# Patient Record
Sex: MALE | Race: WHITE | NOT HISPANIC OR LATINO | Employment: FULL TIME | URBAN - METROPOLITAN AREA
[De-identification: names, ages, dates, MRNs, and addresses within clinical notes are randomized per-mention and may not be internally consistent; named-entity substitution may affect disease eponyms.]

---

## 2015-02-02 LAB — HIV1/HIV2 ANTIBODY: NON REACTIVE

## 2018-01-09 ENCOUNTER — NURSE TRIAGE (OUTPATIENT)
Dept: ADMINISTRATIVE | Facility: CLINIC | Age: 43
End: 2018-01-09

## 2018-01-09 NOTE — TELEPHONE ENCOUNTER
Patient stated he has been having elevated blood sugars lately and wanted to establish care with Dr. Humphreys at [facility]. Transferred to [facility] scheduling for another appointment. Instructed to call back with any additional problems and/or concerns and he verbalized understanding.    Reason for Disposition   Blood glucose  mg/dl (3.5 -13 mmol/l)    Protocols used: ST DIABETES - HIGH BLOOD SUGAR-A-OH

## 2018-01-15 ENCOUNTER — OFFICE VISIT (OUTPATIENT)
Dept: FAMILY MEDICINE | Facility: CLINIC | Age: 43
End: 2018-01-15
Payer: COMMERCIAL

## 2018-01-15 ENCOUNTER — LAB VISIT (OUTPATIENT)
Dept: LAB | Facility: HOSPITAL | Age: 43
End: 2018-01-15
Attending: FAMILY MEDICINE
Payer: COMMERCIAL

## 2018-01-15 VITALS
DIASTOLIC BLOOD PRESSURE: 84 MMHG | HEART RATE: 84 BPM | SYSTOLIC BLOOD PRESSURE: 128 MMHG | BODY MASS INDEX: 34.84 KG/M2 | HEIGHT: 72 IN | TEMPERATURE: 98 F | RESPIRATION RATE: 20 BRPM | WEIGHT: 257.25 LBS | OXYGEN SATURATION: 97 %

## 2018-01-15 DIAGNOSIS — Z23 NEED FOR TDAP VACCINATION: ICD-10-CM

## 2018-01-15 DIAGNOSIS — Z23 NEED FOR PNEUMOCOCCAL VACCINATION: ICD-10-CM

## 2018-01-15 DIAGNOSIS — F33.1 MODERATE EPISODE OF RECURRENT MAJOR DEPRESSIVE DISORDER: ICD-10-CM

## 2018-01-15 DIAGNOSIS — D50.9 IRON DEFICIENCY ANEMIA, UNSPECIFIED IRON DEFICIENCY ANEMIA TYPE: ICD-10-CM

## 2018-01-15 DIAGNOSIS — I10 ESSENTIAL HYPERTENSION: ICD-10-CM

## 2018-01-15 DIAGNOSIS — Z00.00 VISIT FOR WELL MAN HEALTH CHECK: Primary | ICD-10-CM

## 2018-01-15 DIAGNOSIS — Z00.00 VISIT FOR WELL MAN HEALTH CHECK: ICD-10-CM

## 2018-01-15 LAB
ALBUMIN SERPL BCP-MCNC: 4.3 G/DL
ALP SERPL-CCNC: 85 U/L
ALT SERPL W/O P-5'-P-CCNC: 63 U/L
ANION GAP SERPL CALC-SCNC: 9 MMOL/L
AST SERPL-CCNC: 30 U/L
BASOPHILS # BLD AUTO: 0.04 K/UL
BASOPHILS NFR BLD: 0.6 %
BILIRUB SERPL-MCNC: 0.3 MG/DL
BUN SERPL-MCNC: 13 MG/DL
CALCIUM SERPL-MCNC: 10.3 MG/DL
CHLORIDE SERPL-SCNC: 101 MMOL/L
CHOLEST SERPL-MCNC: 149 MG/DL
CHOLEST/HDLC SERPL: 4 {RATIO}
CO2 SERPL-SCNC: 29 MMOL/L
CREAT SERPL-MCNC: 0.8 MG/DL
DIFFERENTIAL METHOD: NORMAL
EOSINOPHIL # BLD AUTO: 0.2 K/UL
EOSINOPHIL NFR BLD: 2.2 %
ERYTHROCYTE [DISTWIDTH] IN BLOOD BY AUTOMATED COUNT: 11.9 %
EST. GFR  (AFRICAN AMERICAN): >60 ML/MIN/1.73 M^2
EST. GFR  (NON AFRICAN AMERICAN): >60 ML/MIN/1.73 M^2
ESTIMATED AVG GLUCOSE: 209 MG/DL
GLUCOSE SERPL-MCNC: 135 MG/DL
HBA1C MFR BLD HPLC: 8.9 %
HCT VFR BLD AUTO: 45.8 %
HDLC SERPL-MCNC: 37 MG/DL
HDLC SERPL: 24.8 %
HGB BLD-MCNC: 15.4 G/DL
LDLC SERPL CALC-MCNC: 66.6 MG/DL
LYMPHOCYTES # BLD AUTO: 2.7 K/UL
LYMPHOCYTES NFR BLD: 38.7 %
MCH RBC QN AUTO: 29.8 PG
MCHC RBC AUTO-ENTMCNC: 33.6 G/DL
MCV RBC AUTO: 89 FL
MONOCYTES # BLD AUTO: 0.8 K/UL
MONOCYTES NFR BLD: 11.2 %
NEUTROPHILS # BLD AUTO: 3.3 K/UL
NEUTROPHILS NFR BLD: 47.3 %
NONHDLC SERPL-MCNC: 112 MG/DL
PLATELET # BLD AUTO: 191 K/UL
PMV BLD AUTO: 9.8 FL
POTASSIUM SERPL-SCNC: 5.1 MMOL/L
PROT SERPL-MCNC: 7.2 G/DL
RBC # BLD AUTO: 5.16 M/UL
SODIUM SERPL-SCNC: 139 MMOL/L
T4 FREE SERPL-MCNC: 0.92 NG/DL
TRIGL SERPL-MCNC: 227 MG/DL
TSH SERPL DL<=0.005 MIU/L-ACNC: 1.51 UIU/ML
WBC # BLD AUTO: 6.97 K/UL

## 2018-01-15 PROCEDURE — 80053 COMPREHEN METABOLIC PANEL: CPT

## 2018-01-15 PROCEDURE — 85025 COMPLETE CBC W/AUTO DIFF WBC: CPT

## 2018-01-15 PROCEDURE — 90715 TDAP VACCINE 7 YRS/> IM: CPT | Mod: S$GLB,,, | Performed by: FAMILY MEDICINE

## 2018-01-15 PROCEDURE — 90732 PPSV23 VACC 2 YRS+ SUBQ/IM: CPT | Mod: S$GLB,,, | Performed by: FAMILY MEDICINE

## 2018-01-15 PROCEDURE — 99999 PR PBB SHADOW E&M-EST. PATIENT-LVL III: CPT | Mod: PBBFAC,,, | Performed by: FAMILY MEDICINE

## 2018-01-15 PROCEDURE — 90472 IMMUNIZATION ADMIN EACH ADD: CPT | Mod: S$GLB,,, | Performed by: FAMILY MEDICINE

## 2018-01-15 PROCEDURE — 84443 ASSAY THYROID STIM HORMONE: CPT

## 2018-01-15 PROCEDURE — 90471 IMMUNIZATION ADMIN: CPT | Mod: S$GLB,,, | Performed by: FAMILY MEDICINE

## 2018-01-15 PROCEDURE — 36415 COLL VENOUS BLD VENIPUNCTURE: CPT | Mod: PN

## 2018-01-15 PROCEDURE — 83036 HEMOGLOBIN GLYCOSYLATED A1C: CPT

## 2018-01-15 PROCEDURE — 80061 LIPID PANEL: CPT

## 2018-01-15 PROCEDURE — 84439 ASSAY OF FREE THYROXINE: CPT

## 2018-01-15 PROCEDURE — 99396 PREV VISIT EST AGE 40-64: CPT | Mod: 25,S$GLB,, | Performed by: FAMILY MEDICINE

## 2018-01-15 RX ORDER — HYDROCHLOROTHIAZIDE 25 MG/1
25 TABLET ORAL EVERY MORNING
Refills: 3 | COMMUNITY
Start: 2017-12-05 | End: 2018-08-20 | Stop reason: ALTCHOICE

## 2018-01-15 RX ORDER — DOXEPIN HYDROCHLORIDE 10 MG/1
10 CAPSULE ORAL NIGHTLY
Refills: 3 | COMMUNITY
Start: 2017-12-01 | End: 2018-04-02

## 2018-01-15 RX ORDER — CLONAZEPAM 1 MG/1
1 TABLET ORAL 3 TIMES DAILY
Refills: 5 | COMMUNITY
Start: 2017-12-06

## 2018-01-15 RX ORDER — DULOXETIN HYDROCHLORIDE 60 MG/1
120 CAPSULE, DELAYED RELEASE ORAL DAILY
Refills: 3 | COMMUNITY
Start: 2017-12-21 | End: 2018-04-02

## 2018-01-15 RX ORDER — DOXYCYCLINE HYCLATE 100 MG
TABLET ORAL
Refills: 2 | COMMUNITY
Start: 2017-12-19 | End: 2018-04-02

## 2018-01-15 RX ORDER — GLIPIZIDE 5 MG/1
TABLET, FILM COATED, EXTENDED RELEASE ORAL
Refills: 3 | COMMUNITY
Start: 2017-12-26 | End: 2018-04-02

## 2018-01-15 RX ORDER — BLOOD-GLUCOSE METER
KIT MISCELLANEOUS
Refills: 3 | COMMUNITY
Start: 2017-12-21

## 2018-01-15 RX ORDER — PEN NEEDLE, DIABETIC 32GX 5/32"
NEEDLE, DISPOSABLE MISCELLANEOUS
Refills: 1 | COMMUNITY
Start: 2017-11-21 | End: 2019-03-18 | Stop reason: SDUPTHER

## 2018-01-15 RX ORDER — ATORVASTATIN CALCIUM 20 MG/1
20 TABLET, FILM COATED ORAL DAILY
Refills: 1 | COMMUNITY
Start: 2017-12-01 | End: 2019-01-03 | Stop reason: SDUPTHER

## 2018-01-15 RX ORDER — LISINOPRIL 40 MG/1
40 TABLET ORAL DAILY
Refills: 1 | COMMUNITY
Start: 2017-12-21 | End: 2018-04-02

## 2018-01-15 RX ORDER — BLOOD-GLUCOSE METER
KIT MISCELLANEOUS
Refills: 0 | COMMUNITY
Start: 2017-11-21

## 2018-01-15 RX ORDER — DEXTROAMPHETAMINE SACCHARATE, AMPHETAMINE ASPARTATE, DEXTROAMPHETAMINE SULFATE AND AMPHETAMINE SULFATE 7.5; 7.5; 7.5; 7.5 MG/1; MG/1; MG/1; MG/1
TABLET ORAL
Refills: 0 | COMMUNITY
Start: 2017-12-21

## 2018-01-15 RX ORDER — INSULIN GLARGINE 100 [IU]/ML
INJECTION, SOLUTION SUBCUTANEOUS
Refills: 1 | COMMUNITY
Start: 2017-11-21 | End: 2018-10-23 | Stop reason: SDUPTHER

## 2018-01-15 RX ORDER — METFORMIN HYDROCHLORIDE 500 MG/1
TABLET ORAL
Refills: 3 | COMMUNITY
Start: 2017-12-28 | End: 2018-07-16 | Stop reason: ALTCHOICE

## 2018-01-15 NOTE — PROGRESS NOTES
The Tdap and pneumococcal 23 vaccines were  given to the pt as ordered by the MD. The patient tolerated well, I advised the patient to wait 15 minuets to observe for any vaccine reactions. The pt. Expressed an understanding.

## 2018-01-15 NOTE — PROGRESS NOTES
[facility] VISIT      CHIEF COMPLAINT  Chief Complaint   Patient presents with    Annual Exam     hx of elevated b/p and glucose        HPI  Enrique Grande is a 42 y.o. male who presents for physical and to establish care.     Social Factors  Tobacco use: No  Ready to Quit: No  Alcohol: No  Intimate partner violence screening  Regular Exercise: No    Depression  Over the past two weeks, have you felt down, depressed, or hopeless? Yes sees psychiatry  Over the past two weeks, have you felt little interest or pleasure in doing things? Yes     Reproductive Health  STD screening in last year: declined  HIV screening: declined    Screen for Chronic Disease  CHD Risk Factors: diabetes mellitus, hypertension, male gender and obesity (BMI >= 30 kg/m2)  Estimated body mass index is 34.89 kg/m² as calculated from the following:    Height as of this encounter: 6' (1.829 m).    Weight as of this encounter: 116.7 kg (257 lb 4.4 oz).  Dyslipidemia screening needed: order 1/15/18  T2DM screening needed: order 1/15/18  Colonoscopy needed: n/a  PSA needed: 1/15/18  AAA screening needed:n/a  Screen men 35 years and older, and men 20 to 34 years of age who have cardiovascular risk factors for dyslipidemia  Begin screening colonoscopies at 50 years of age in men of average risk, and continue until 75 years of age; offer fecal occult blood testing every year, flexible sigmoidoscopy every five years combined with fecal occult blood testing every three years, or colonoscopy every 10 years   The American Urological Association recommends offering PSA testing and digital rectal examination to well-informed men beginning at 40 years of age and continuing until life expectancy is less than 10 years  Screen once with ultrasonography in men 65 to 75 years of age if they have a family history or have smoked at [id number] cigarettes in their lifetime  Screen men with a sustained blood pressure greater than 135/80 mm Hg for  T2DM      Immunizations  delayed    ALLERGIES and MEDS were verified.   PMHx, PSHx, FHx, SOCIALHx were updated as pertinent.    REVIEW OF SYSTEMS  Review of Systems   Constitutional: Negative.    HENT: Negative.    Eyes: Negative.    Respiratory: Negative.    Cardiovascular: Negative.    Gastrointestinal: Negative.    Genitourinary: Negative.    Musculoskeletal: Negative.    Skin: Negative.    Psychiatric/Behavioral: Positive for depression. Negative for suicidal ideas.         PHYSICAL EXAM  VITAL SIGNS: /84   Pulse 84   Temp 97.7 °F (36.5 °C) (Oral)   Resp 20   Ht 6' (1.829 m)   Wt 116.7 kg (257 lb 4.4 oz)   SpO2 97%   BMI 34.89 kg/m²   GEN: Well developed, Well nourished, No acute distress.  HENT: Normocephalic, Atraumatic, Bilateral external ears normal, Nose normal, Oropharynx moist, No oral exudates.   Eyes: PERRL, EOMI, Conjunctiva normal, No discharge.   Neck: Supple, No tenderness.  Lymphatic: No cervical or supraclavicular lymphadenopathy noted.   Cardiovascular: Normal heart rate, Normal rhythm, No murmurs, No rubs, No gallops.   Thorax & Lungs: Normal breath sounds, No respiratory distress, No wheezing.  Abdomen: Soft, No tenderness, Bowel sounds normal.  Genital: deffered  Skin: Warm, Dry, No erythema, No rash.   Extremities: No edema, No tenderness.       ASSESSMENT/PLAN    Enrique was seen today for annual exam.    Diagnoses and all orders for this visit:    Visit for [facility] check  -     (In Office Administered) Tdap Vaccine  -     Hemoglobin A1c; Future  -     Lipid panel; Future  -     (In Office Administered) Pneumococcal Polysaccharide Vaccine (23 Valent) (SQ/IM)  -     CBC auto differential; Future  -     Comprehensive metabolic panel; Future  -     MICROALBUMIN / CREATININE RATIO URINE; Future  -     TSH; Future  -     T4, free; Future    Uncontrolled type 2 diabetes mellitus without complication, with long-term current use of insulin  -     Hemoglobin A1c;  Future    Essential hypertension    Iron deficiency anemia, unspecified iron deficiency anemia type    Moderate episode of recurrent major depressive disorder       1.  Patient to have labs done today  2.  Likely poor adherence to medication regimen causing hyperglycemia  3.  HTN controlled at this time  4.  Follow up with psychiatry as scheduled.  He is to go to the ED for any SI/HI or AH/VH  5.  Dermatology follow up for psoriasis  6.  Follow up with me in 3 months or sooner if needed    Sunny Humphreys MD

## 2018-01-22 ENCOUNTER — TELEPHONE (OUTPATIENT)
Dept: FAMILY MEDICINE | Facility: CLINIC | Age: 43
End: 2018-01-22

## 2018-01-29 ENCOUNTER — TELEPHONE (OUTPATIENT)
Dept: ENDOCRINOLOGY | Facility: CLINIC | Age: 43
End: 2018-01-29

## 2018-01-29 NOTE — TELEPHONE ENCOUNTER
Left message for patient to return call to clinic to reschedule initial visit per patient's request before April. No visits week of Mardi [place] available.

## 2018-01-29 NOTE — TELEPHONE ENCOUNTER
----- Message from Sabrina De Jesus sent at 1/26/2018  4:13 PM CST -----  Contact: self  Pt asked if he can get in for the week of [place] A [place] to please give him a call at [phone]. Thanks

## 2018-01-29 NOTE — TELEPHONE ENCOUNTER
Patient called back and rescheduled appointment for Mon 3/12/18 at 2:00p. Patient verbalized understanding. Reminder letter mailed.

## 2018-04-02 ENCOUNTER — OFFICE VISIT (OUTPATIENT)
Dept: FAMILY MEDICINE | Facility: CLINIC | Age: 43
End: 2018-04-02
Payer: COMMERCIAL

## 2018-04-02 VITALS
OXYGEN SATURATION: 96 % | SYSTOLIC BLOOD PRESSURE: 110 MMHG | HEIGHT: 72 IN | TEMPERATURE: 98 F | WEIGHT: 247.38 LBS | BODY MASS INDEX: 33.51 KG/M2 | DIASTOLIC BLOOD PRESSURE: 78 MMHG | HEART RATE: 71 BPM

## 2018-04-02 DIAGNOSIS — I10 ESSENTIAL HYPERTENSION: Primary | ICD-10-CM

## 2018-04-02 PROCEDURE — 3074F SYST BP LT 130 MM HG: CPT | Mod: CPTII,S$GLB,, | Performed by: FAMILY MEDICINE

## 2018-04-02 PROCEDURE — 99214 OFFICE O/P EST MOD 30 MIN: CPT | Mod: S$GLB,,, | Performed by: FAMILY MEDICINE

## 2018-04-02 PROCEDURE — 3045F PR MOST RECENT HEMOGLOBIN A1C LEVEL 7.0-9.0%: CPT | Mod: CPTII,S$GLB,, | Performed by: FAMILY MEDICINE

## 2018-04-02 PROCEDURE — 99999 PR PBB SHADOW E&M-EST. PATIENT-LVL III: CPT | Mod: PBBFAC,,, | Performed by: FAMILY MEDICINE

## 2018-04-02 PROCEDURE — 3078F DIAST BP <80 MM HG: CPT | Mod: CPTII,S$GLB,, | Performed by: FAMILY MEDICINE

## 2018-04-02 RX ORDER — LISINOPRIL 20 MG/1
20 TABLET ORAL DAILY
COMMUNITY
End: 2018-06-11

## 2018-04-02 RX ORDER — FLUVOXAMINE MALEATE 50 MG/1
TABLET ORAL
Refills: 5 | COMMUNITY
Start: 2018-02-28 | End: 2018-10-23

## 2018-04-02 RX ORDER — OXYCODONE AND ACETAMINOPHEN 5; 325 MG/1; MG/1
1 TABLET ORAL
COMMUNITY
Start: 2014-06-06 | End: 2018-04-02

## 2018-04-02 RX ORDER — LITHIUM CARBONATE 300 MG/1
1800 CAPSULE ORAL NIGHTLY
Refills: 11 | COMMUNITY
Start: 2018-02-28

## 2018-04-02 NOTE — PROGRESS NOTES
"Routine Office Visit    Patient Name: Enrique Grande    : 1975  MRN: [id number]    Subjective:  Enrique is a 42 y.o. male who presents today for:    1. htn  Patient presenting today with complaints of low blood pressures.  He states that he was having these low readings of 96/58 prior to going to [facility] who stopped his norvasc.  Since then, his blood pressures have been running normal.  There has been no dizziness, weakness, slurred speech, or confusion.  He states that he "feels weird" at time and that's when his blood pressure is lower.  He has not been checking his blood sugars during these episodes    Past Medical History  Past Medical History:   Diagnosis Date    Anxiety     AR (allergic rhinitis)     Depression     Diabetes mellitus, type 2     GERD (gastroesophageal reflux disease)     History of psychiatric hospitalization     Hx of psychiatric care     Hypertension     Psychiatric problem     Therapy        Past Surgical History  Past Surgical History:   Procedure Laterality Date    FOREARM SURGERY      deep laceration, with nerve and tendon injuries       Family History  Family History   Problem Relation Age of Onset    Diabetes Mother     Alcohol abuse Father     Diabetes Sister     Diabetes Maternal Aunt     Alcohol abuse Paternal Uncle     Alcohol abuse Paternal Grandfather     Suicide Neg Hx     Schizophrenia Neg Hx     Bipolar disorder Neg Hx     Dementia Neg Hx        Social History  Social History     Social History    Marital status: Single     Spouse name: N/A    Number of children: 0    Years of education: N/A     Occupational History    teacher      [employer]     Social History Main Topics    Smoking status: Never Smoker    Smokeless tobacco: Not on file    Alcohol use 1.8 oz/week     3 Glasses of wine per week      Comment: socially    Drug use: No    Sexual activity: Not on file     Other Topics Concern    Not on file     Social History Narrative " "   The patient does not exercise regularly ().  Rates diet as good.  He is not satisfied with weight.        Pt is the 3rd child and only boy of 5 children from parents who  when he was very young.  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  Hobbies when he feels better include travel, gardening, reading, and a pet dog.  He never , has no children, lives alone, and broke up with a male partner about 18 months ago, with no current relationship.  He has few friends and is not Voodoo.                   Current Medications  Current Outpatient Prescriptions on File Prior to Visit   Medication Sig Dispense Refill    aspirin 81 MG Chew Take 81 mg by mouth once daily.      atorvastatin (LIPITOR) 20 MG tablet Take 20 mg by mouth once daily.  1    clonazePAM (KLONOPIN) 1 MG tablet Take 1 mg by mouth 3 (three) times daily.  5    dextroamphetamine-amphetamine 30 mg Tab TAKE 1 TABLET BY MOUTH 3 TIMES A DAY Do Not Fill Before 11/29/2017  0    hydroCHLOROthiazide (HYDRODIURIL) 25 MG tablet Take 25 mg by mouth every morning.  3    LANTUS SOLOSTAR 100 unit/mL (3 mL) InPn pen INJECT 33u SUBCUTANEOUS TWICE A DAY  1    metFORMIN (GLUCOPHAGE) 500 MG tablet TAKE 2 TABLETS BY MOUTH TWICE A DAY FOR BLOOD SUGAR  3    [DISCONTINUED] glipiZIDE (GLUCOTROL) 5 MG TR24 TK 1 T PO QD IN THE MORNING  3    FREESTYLE FREEDOM LITE kit USE TO CHECK BLOOD SUGAR as directed daily  0    FREESTYLE LITE STRIPS Strp as directed Three times daily as needed  3    guselkumab (TREMFYA) 100 mg/mL Syrg Inject 100 mg into the skin.      ULTICARE PEN NEEDLE 32 gauge x 5/32" Ndle USE AS DIRECTED TWICE A DAY FOR INSULIN INJECTION  1    [DISCONTINUED] doxepin (SINEQUAN) 10 MG capsule Take 10 mg by mouth nightly.  3    [DISCONTINUED] doxycycline (VIBRA-TABS) 100 MG tablet TAKE 1 TABLET BY MOUTH ONCE A DAY WITH FOOD  2    [DISCONTINUED] " DULoxetine (CYMBALTA) 60 MG capsule Take 120 mg by mouth once daily.  3    [DISCONTINUED] lisinopril (PRINIVIL,ZESTRIL) 40 MG tablet Take 40 mg by mouth once daily.  1     No current facility-administered medications on file prior to visit.        Allergies   Review of patient's allergies indicates:  No Known Allergies    Review of Systems (Pertinent positives)  Review of Systems   Constitutional: Negative.    HENT: Negative.    Respiratory: Negative.    Cardiovascular: Negative.    Gastrointestinal: Negative.    Musculoskeletal: Negative.    Skin: Negative.          /78   Pulse 71   Temp 98 °F (36.7 °C) (Oral)   Ht 6' (1.829 m)   Wt 112.2 kg (247 lb 5.7 oz)   SpO2 96%   BMI 33.55 kg/m²     GENERAL APPEARANCE: in no apparent distress and well developed and well nourished  HEENT: PERRL, EOMI, Sclera clear, anicteric, Oropharynx clear, no lesions, Neck supple with midline trachea  NECK: normal, supple, no adenopathy, thyroid normal in size  RESPIRATORY: appears well, vitals normal, no respiratory distress, acyanotic, normal RR, chest clear, no wheezing, crepitations, rhonchi, normal symmetric air entry  HEART: regular rate and rhythm, S1, S2 normal, no murmur, click, rub or gallop.    ABDOMEN: abdomen is soft without tenderness, no masses, no hernias, no organomegaly, no rebound, no guarding. Suprapubic tenderness absent. No CVA tenderness.  NEUROLOGIC: normal without focal findings, CN II-XII are intact.   SKIN: no rashes, no wounds, no other lesions  PSYCH: Alert, oriented x 3, thought content appropriate, speech normal, pleasant and cooperative, good eye contact, well groomed    Protective Sensation (w/ 10 gram monofilament):  Right: Intact  Left: Intact    Visual Inspection:  Normal -  Bilateral    Pedal Pulses:   Right: Present  Left: Present    Posterior tibialis:   Right:Present  Left: Present      Assessment/Plan:  Enrique Grande is a 42 y.o. male who presents today for :    Enrique was  seen today for follow-up.    Diagnoses and all orders for this visit:    Essential hypertension  -     Ambulatory referral to Ophthalmology    Uncontrolled type 2 diabetes mellitus without complication, with long-term current use of insulin  -     Ambulatory referral to Ophthalmology      1.  Patients blood sugars continue to be out of control  2.  Blood pressure stable  3.  Follow up 3 months or sooner if needed  4.  He is to go to the ED for any worsening symptoms      Sunny Humphreys MD

## 2018-04-13 ENCOUNTER — TELEPHONE (OUTPATIENT)
Dept: ENDOCRINOLOGY | Facility: CLINIC | Age: 43
End: 2018-04-13

## 2018-06-08 DIAGNOSIS — Z13.5 DIABETIC RETINOPATHY SCREENING: ICD-10-CM

## 2018-06-11 ENCOUNTER — OFFICE VISIT (OUTPATIENT)
Dept: FAMILY MEDICINE | Facility: CLINIC | Age: 43
End: 2018-06-11
Payer: COMMERCIAL

## 2018-06-11 ENCOUNTER — LAB VISIT (OUTPATIENT)
Dept: LAB | Facility: HOSPITAL | Age: 43
End: 2018-06-11
Attending: FAMILY MEDICINE
Payer: COMMERCIAL

## 2018-06-11 VITALS
HEART RATE: 69 BPM | HEIGHT: 72 IN | SYSTOLIC BLOOD PRESSURE: 106 MMHG | RESPIRATION RATE: 12 BRPM | WEIGHT: 245.38 LBS | BODY MASS INDEX: 33.23 KG/M2 | OXYGEN SATURATION: 97 % | DIASTOLIC BLOOD PRESSURE: 76 MMHG | TEMPERATURE: 98 F

## 2018-06-11 DIAGNOSIS — I10 ESSENTIAL HYPERTENSION: ICD-10-CM

## 2018-06-11 DIAGNOSIS — K52.9 CHRONIC DIARRHEA: Primary | ICD-10-CM

## 2018-06-11 DIAGNOSIS — K52.9 CHRONIC DIARRHEA: ICD-10-CM

## 2018-06-11 LAB
ALBUMIN SERPL BCP-MCNC: 3.9 G/DL
ALP SERPL-CCNC: 74 U/L
ALT SERPL W/O P-5'-P-CCNC: 26 U/L
ANION GAP SERPL CALC-SCNC: 9 MMOL/L
AST SERPL-CCNC: 17 U/L
BILIRUB SERPL-MCNC: 0.3 MG/DL
BUN SERPL-MCNC: 15 MG/DL
CALCIUM SERPL-MCNC: 9.7 MG/DL
CHLORIDE SERPL-SCNC: 102 MMOL/L
CO2 SERPL-SCNC: 26 MMOL/L
CREAT SERPL-MCNC: 0.9 MG/DL
EST. GFR  (AFRICAN AMERICAN): >60 ML/MIN/1.73 M^2
EST. GFR  (NON AFRICAN AMERICAN): >60 ML/MIN/1.73 M^2
ESTIMATED AVG GLUCOSE: 206 MG/DL
GLUCOSE SERPL-MCNC: 161 MG/DL
HBA1C MFR BLD HPLC: 8.8 %
POTASSIUM SERPL-SCNC: 4.1 MMOL/L
PROT SERPL-MCNC: 6.5 G/DL
SODIUM SERPL-SCNC: 137 MMOL/L

## 2018-06-11 PROCEDURE — 99999 PR PBB SHADOW E&M-EST. PATIENT-LVL III: CPT | Mod: PBBFAC,,, | Performed by: FAMILY MEDICINE

## 2018-06-11 PROCEDURE — 83036 HEMOGLOBIN GLYCOSYLATED A1C: CPT

## 2018-06-11 PROCEDURE — 36415 COLL VENOUS BLD VENIPUNCTURE: CPT | Mod: PN

## 2018-06-11 PROCEDURE — 3074F SYST BP LT 130 MM HG: CPT | Mod: CPTII,S$GLB,, | Performed by: FAMILY MEDICINE

## 2018-06-11 PROCEDURE — 3078F DIAST BP <80 MM HG: CPT | Mod: CPTII,S$GLB,, | Performed by: FAMILY MEDICINE

## 2018-06-11 PROCEDURE — 3008F BODY MASS INDEX DOCD: CPT | Mod: CPTII,S$GLB,, | Performed by: FAMILY MEDICINE

## 2018-06-11 PROCEDURE — 99214 OFFICE O/P EST MOD 30 MIN: CPT | Mod: S$GLB,,, | Performed by: FAMILY MEDICINE

## 2018-06-11 PROCEDURE — 80053 COMPREHEN METABOLIC PANEL: CPT

## 2018-06-11 PROCEDURE — 3045F PR MOST RECENT HEMOGLOBIN A1C LEVEL 7.0-9.0%: CPT | Mod: CPTII,S$GLB,, | Performed by: FAMILY MEDICINE

## 2018-06-11 RX ORDER — GLIPIZIDE 5 MG/1
5 TABLET, FILM COATED, EXTENDED RELEASE ORAL DAILY
Refills: 3 | COMMUNITY
Start: 2018-04-12 | End: 2018-07-16

## 2018-06-11 RX ORDER — LISINOPRIL 10 MG/1
10 TABLET ORAL DAILY
Qty: 90 TABLET | Refills: 3 | Status: SHIPPED | OUTPATIENT
Start: 2018-06-11 | End: 2019-01-03 | Stop reason: ALTCHOICE

## 2018-06-11 NOTE — PROGRESS NOTES
"Routine Office Visit    Patient Name: Enrique Grande    : 1975  MRN: [id number]    Subjective:  Enrique is a 43 y.o. male who presents today for:    1. Chronic diarrhea  Patient presenting today for chronic diarrhea for "forever".  He states that he has multiple liquid stools a day.  He states that he thought it was related to his metformin, so he "just quit taking that medication".  He states that the diarrhea continued despite being off the metformin.  He does not want to see GI or have a colonoscopy at this time.  There is no blood in stool and no melena.  He has not had any abdominal pain, fevers, chills, or rash.  There are no foods associated with the diarrhea.  He states that some days he takes more than the recommended dosage of immodium, but then other days he doesn't have to take any.      2.  Type 2 DM  Patient presenting today for follow up of type 2 DM.  He states that this last A1c was the highest it has ever been.  He is now taking all of his medications as prescribed.  He states that he has been taking all of his meds for the past several weeks. No neuropathy at this time.  No hypoglycemic episodes.      Past Medical History  Past Medical History:   Diagnosis Date    Anxiety     AR (allergic rhinitis)     Depression     Diabetes mellitus, type 2     GERD (gastroesophageal reflux disease)     History of psychiatric hospitalization     Hx of psychiatric care     Hypertension     Psychiatric problem     Therapy        Past Surgical History  Past Surgical History:   Procedure Laterality Date    FOREARM SURGERY      deep laceration, with nerve and tendon injuries       Family History  Family History   Problem Relation Age of Onset    Diabetes Mother     Alcohol abuse Father     Diabetes Sister     Diabetes Maternal Aunt     Alcohol abuse Paternal Uncle     Alcohol abuse Paternal Grandfather     Suicide Neg Hx     Schizophrenia Neg Hx     Bipolar disorder Neg Hx     " Dementia Neg Hx        Social History  Social History     Social History    Marital status: Single     Spouse name: N/A    Number of children: 0    Years of education: N/A     Occupational History    teacher      [employer]     Social History Main Topics    Smoking status: Never Smoker    Smokeless tobacco: Not on file    Alcohol use 1.8 oz/week     3 Glasses of wine per week      Comment: socially    Drug use: No    Sexual activity: Not on file     Other Topics Concern    Not on file     Social History Narrative    The patient does not exercise regularly ().  Rates diet as good.  He is not satisfied with weight.        Pt is the 3rd child and only boy of 5 children from parents who  when he was very young.  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  Hobbies when he feels better include travel, gardening, reading, and a pet dog.  He never , has no children, lives alone, and broke up with a male partner about 18 months ago, with no current relationship.  He has few friends and is not [employer].                   Current Medications  Current Outpatient Prescriptions on File Prior to Visit   Medication Sig Dispense Refill    aspirin 81 MG Chew Take 81 mg by mouth once daily.      atorvastatin (LIPITOR) 20 MG tablet Take 20 mg by mouth once daily.  1    clonazePAM (KLONOPIN) 1 MG tablet Take 1 mg by mouth 3 (three) times daily.  5    dextroamphetamine-amphetamine 30 mg Tab TAKE 1 TABLET BY MOUTH 3 TIMES A DAY Do Not Fill Before 11/29/2017  0    fluvoxaMINE (LUVOX) 50 MG Tab tablet TAKE 3 TABLETS BY MOUTH TWICE A DAY AS DIRECTED  5    FREESTYLE FREEDOM LITE kit USE TO CHECK BLOOD SUGAR as directed daily  0    FREESTYLE LITE STRIPS Strp as directed Three times daily as needed  3    guselkumab (TREMFYA) 100 mg/mL Syrg Inject 100 mg into the skin.      hydroCHLOROthiazide (HYDRODIURIL) 25 MG  "tablet Take 25 mg by mouth every morning.  3    LANTUS SOLOSTAR 100 unit/mL (3 mL) InPn pen INJECT 33u SUBCUTANEOUS TWICE A DAY  1    lithium (ESKALITH) 300 MG capsule Take 1,800 mg by mouth nightly.  11    metFORMIN (GLUCOPHAGE) 500 MG tablet TAKE 2 TABLETS BY MOUTH TWICE A DAY FOR BLOOD SUGAR  3    ULTICARE PEN NEEDLE 32 gauge x 5/32" Ndle USE AS DIRECTED TWICE A DAY FOR INSULIN INJECTION  1    [DISCONTINUED] lisinopril (PRINIVIL,ZESTRIL) 20 MG tablet Take 20 mg by mouth once daily.       No current facility-administered medications on file prior to visit.        Allergies   Review of patient's allergies indicates:  No Known Allergies    Review of Systems (Pertinent positives)  Review of Systems   Constitutional: Negative.    HENT: Negative.    Eyes: Negative.    Respiratory: Negative.    Cardiovascular: Negative.    Gastrointestinal: Positive for diarrhea. Negative for abdominal pain, blood in stool, constipation, melena and vomiting.   Musculoskeletal: Negative.    Skin: Negative.          /76 (BP Location: Right arm, Patient Position: Sitting, BP Method: Medium (Manual))   Pulse 69   Temp 98.2 °F (36.8 °C) (Oral)   Resp 12   Ht 6' (1.829 m)   Wt 111.3 kg (245 lb 6 oz)   SpO2 97%   BMI 33.28 kg/m²     GENERAL APPEARANCE: in no apparent distress and well developed and well nourished  HEENT: PERRL, EOMI, Sclera clear, anicteric, Oropharynx clear, no lesions, Neck supple with midline trachea  NECK: normal, supple, no adenopathy, thyroid normal in size  RESPIRATORY: appears well, vitals normal, no respiratory distress, acyanotic, normal RR, chest clear, no wheezing, crepitations, rhonchi, normal symmetric air entry  HEART: regular rate and rhythm, S1, S2 normal, no murmur, click, rub or gallop.    ABDOMEN: abdomen is soft without tenderness, no masses, no hernias, no organomegaly, no rebound, no guarding. Suprapubic tenderness absent. No CVA tenderness.  NEUROLOGIC: normal without focal findings, " "CN II-XII are intact.   SKIN: no rashes, no wounds, no other lesions  PSYCH: Alert, oriented x 3, thought content appropriate, speech normal, pleasant and cooperative, good eye contact, well groomed    Assessment/Plan:  Enrique Grande is a 43 y.o. male who presents today for :    Enrique was seen today for diarrhea.    Diagnoses and all orders for this visit:    Chronic diarrhea  -     Comprehensive metabolic panel; Future  -     cholestyramine-aspartame 4 gram Powd; Take 4 g by mouth once daily.    Uncontrolled type 2 diabetes mellitus without complication, with long-term current use of insulin  -     Comprehensive metabolic panel; Future  -     Hemoglobin A1c; Future  -     lisinopril 10 MG tablet; Take 1 tablet (10 mg total) by mouth once daily.    Essential hypertension  -     lisinopril 10 MG tablet; Take 1 tablet (10 mg total) by mouth once daily.      1.  cholestyraine with aspartame for diarrhea  2.  Recommended he see GI, but he declines  3.  Labs to be done today to check electrolytes and a1c  4.  Requested he have eyecam exam done today, but he states "i'm in a hurry and can't today".   5.  Patient to take all meds as prescribed  6.  He is to follow up in 3 months or sooner if needed    Sunny Humphreys MD      "

## 2018-07-16 ENCOUNTER — OFFICE VISIT (OUTPATIENT)
Dept: ENDOCRINOLOGY | Facility: CLINIC | Age: 43
End: 2018-07-16
Payer: COMMERCIAL

## 2018-07-16 VITALS
HEART RATE: 69 BPM | BODY MASS INDEX: 33.29 KG/M2 | SYSTOLIC BLOOD PRESSURE: 116 MMHG | HEIGHT: 72 IN | DIASTOLIC BLOOD PRESSURE: 83 MMHG | WEIGHT: 245.81 LBS

## 2018-07-16 DIAGNOSIS — E66.9 NON MORBID OBESITY, UNSPECIFIED OBESITY TYPE: ICD-10-CM

## 2018-07-16 DIAGNOSIS — R19.7 DIARRHEA, UNSPECIFIED TYPE: ICD-10-CM

## 2018-07-16 DIAGNOSIS — I10 ESSENTIAL HYPERTENSION: ICD-10-CM

## 2018-07-16 PROCEDURE — 3008F BODY MASS INDEX DOCD: CPT | Mod: CPTII,S$GLB,, | Performed by: NURSE PRACTITIONER

## 2018-07-16 PROCEDURE — 99999 PR PBB SHADOW E&M-EST. PATIENT-LVL III: CPT | Mod: PBBFAC,,, | Performed by: NURSE PRACTITIONER

## 2018-07-16 PROCEDURE — 3045F PR MOST RECENT HEMOGLOBIN A1C LEVEL 7.0-9.0%: CPT | Mod: CPTII,S$GLB,, | Performed by: NURSE PRACTITIONER

## 2018-07-16 PROCEDURE — 3079F DIAST BP 80-89 MM HG: CPT | Mod: CPTII,S$GLB,, | Performed by: NURSE PRACTITIONER

## 2018-07-16 PROCEDURE — 99204 OFFICE O/P NEW MOD 45 MIN: CPT | Mod: S$GLB,,, | Performed by: NURSE PRACTITIONER

## 2018-07-16 PROCEDURE — 3074F SYST BP LT 130 MM HG: CPT | Mod: CPTII,S$GLB,, | Performed by: NURSE PRACTITIONER

## 2018-07-16 RX ORDER — METFORMIN HYDROCHLORIDE 500 MG/1
1000 TABLET, EXTENDED RELEASE ORAL 2 TIMES DAILY WITH MEALS
Qty: 120 TABLET | Refills: 0 | Status: SHIPPED | OUTPATIENT
Start: 2018-07-16 | End: 2018-10-16 | Stop reason: SDUPTHER

## 2018-07-16 RX ORDER — INDOMETHACIN 50 MG/1
CAPSULE ORAL
Refills: 0 | COMMUNITY
Start: 2018-07-09

## 2018-07-16 NOTE — PROGRESS NOTES
CC: This 43 y.o. White male  is here for evaluation of  T2DM along with comorbidities indicated in the Visit Diagnosis section of this encounter.    HPI: Enrique Grande was diagnosed with T2DM in his 30s. Metformin started at the time of diagnosis. + family hx of T2DM - mother, maternal aunt, sister, maternal GM, many cousins. Denies complications from DM in the family.     New to Endocrine. Referred by Dr. Humphreys.   Hasn't taken any metformin for weeks because of diarrhea. However, diarrhea continued while he was off it so he started a probiotic which has helped and he hasn't had any diarrhea recently.    Pt stopped taking glipizide - didn't find it was that helpful. Pt finds it difficult to manage polypharmacy.       Pt sees Psych every 1-3 months.     LAST DIABETES EDUCATION: years ago     HOSPITALIZED FOR DIABETES -  No.    PRESCRIBED DIABETES MEDICATIONS: metformin 1000 mg bid, Lantus Solostar 32 units bid   Misses medication doses - Yes - metformin as above. Denies lantus doses.     DM COMPLICATIONS: none     SIGNIFICANT DIABETES MED HISTORY: has tried Victoza and Byetta but did not find them helpful and c/o GI s/e.     SELF MONITORING BLOOD GLUCOSE: uses accuchek angie. Checks blood glucose at home - inconsistent. Meter reveals 175-403.   Fasting 206 this morning  Bedtime 280s last night     HYPOGLYCEMIC EPISODES: denies      CURRENT DIET: drinks water, club soda, nothing with sugar. Eats 1-3 meals/day. Always eats dinner and usually lunch.   Diet recall: dinner was beet and blue cheese sandwich, pork loin. 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     CURRENT EXERCISE: none; has gym membership. Walks dog once daily - 10-30 minutes each time.     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   PSYCH: + depression, sees Psych, denies SI   OTHER: n/a      PHYSICAL EXAM:  GENERAL: Well developed, well nourished. No acute distress.   PSYCH: AAOx3,  conversant, well-groomed. Judgement and insight good. Pleasant, + anxious   NEURO: Cranial nerves grossly intact. Speech clear, no tremor.   NECK: Trachea midline, no thyromegaly or lymphadenopathy.   CHEST: Respirations even and unlabored. CTA bilaterally.  CARDIOVASCULAR: Regular rate and rhythm. No bruits. No murmur. No edema.   ABDOMEN: Soft, non-tender, non-distended. Bowel sounds present.   MS: Gait steady. No clubbing.   SKIN: Normal skin turgor. Skin warm and dry. No areas of breakdown. No acanthosis nigricans.  FOOT: deferred, saw Podiatry last week       Hemoglobin A1C   Date Value Ref Range Status   06/11/2018 8.8 (H) 4.0 - 5.6 % Final     Comment:     ADA Screening Guidelines:  5.7-6.4%  Consistent with prediabetes  >or=6.5%  Consistent with diabetes  High levels of fetal hemoglobin interfere with the HbA1C  assay. Heterozygous hemoglobin variants (HbS, HgC, etc)do  not significantly interfere with this assay.   However, presence of multiple variants may affect accuracy.     01/15/2018 8.9 (H) 4.0 - 5.6 % Final     Comment:     According to ADA guidelines, hemoglobin A1c <7.0% represents  optimal control in non-pregnant diabetic patients. Different  metrics may apply to specific patient populations.   Standards of Medical Care in Diabetes-2016.  For the purpose of screening for the presence of diabetes:  <5.7%     Consistent with the absence of diabetes  5.7-6.4%  Consistent with increasing risk for diabetes   (prediabetes)  >or=6.5%  Consistent with diabetes  Currently, no consensus  exists for use of hemoglobin A1c  for diagnosis of diabetes for children.  This Hemoglobin A1c assay has significant interference with fetal   hemoglobin   (HbF). The results are invalid for patients with abnormal amounts of   HbF,   including those with known Hereditary Persistence   of Fetal Hemoglobin. Heterozygous hemoglobin variants (HbAS, HbAC,   HbAD, HbAE, HbA2) do not significantly interfere with this assay;   however, presence of multiple variants in a sample may impact the %   interference.     01/28/2015 7.7 (H) 4.5 - 6.2 % Final           Chemistry        Component Value Date/Time     06/11/2018 0854    K 4.1 06/11/2018 0854     06/11/2018 0854    CO2 26 06/11/2018 0854    BUN 15 06/11/2018 0854    CREATININE 0.9 06/11/2018 0854     (H) 06/11/2018 0854        Component Value Date/Time    CALCIUM 9.7 06/11/2018 0854    ALKPHOS 74 06/11/2018 0854    AST 17 06/11/2018 0854    ALT 26 06/11/2018 0854    BILITOT 0.3 06/11/2018 0854    ESTGFRAFRICA >60 06/11/2018 0854    EGFRNONAA >60 06/11/2018 0854          Lab Results   Component Value Date    LDLCALC 66.6 01/15/2018        Ref. Range 1/15/2018 08:48   Cholesterol Latest Ref Range: 120 - 199 mg/dL 149   HDL Latest Ref Range: 40 - 75 mg/dL 37 (L)   LDL Cholesterol Latest Ref Range: 63.0 - 159.0 mg/dL 66.6   Total Cholesterol/HDL Ratio Latest Ref Range: 2.0 - 5.0  4.0   Triglycerides Latest Ref Range: 30 - 150 mg/dL 227 (H)     Lab Results   Component Value Date    MICALBCREAT 48.7 (H) 01/15/2018       STANDARDS of CARE:        ASA:               Last eye exam:       ASSESSMENT and PLAN:    A1C GOAL: < 7 %     1. Uncontrolled type 2 diabetes mellitus without complication, with long-term current use of insulin  Discussed progression of DM disease, long term complications, and tx options. Reviewed A1c and BG goals.     Switch to metformin  mg tablets. Titrate to 1000 mg bid as tolerated.   Start Jardiance 10 mg once daily.   Continue  Lantus twice daily.   Test blood sugar 2x/day.   Increase exercise.   Ideally eat 3 balanced meals/day. Pt declines RD visit.   Return to clinic in 4 weeks.    2. Essential hypertension  Controlled. May need to cut down on hctz dose with Jardiance.    3. Non morbid obesity, unspecified obesity type  Increases insulin resistance.      4. Diarrhea, unspecified type  Try metformin XR as above.        No orders of the defined types were placed in this encounter.       Follow-up in about 4 weeks (around 8/13/2018).     Thank you very much for allowing me to participate in Enrique Grande's care.

## 2018-07-16 NOTE — PATIENT INSTRUCTIONS
A1C goal: <7%  Fasting/premeal blood glucose goal:   2 hour post-meal blood glucose goal: less than 180    Continue Lantus twice daily.   Test blood sugar 2x/day.   Increase exercise.   Ideally eat 3 balanced meals/day.  Return to clinic in 4 weeks.     Drink at least 2 liters of water to avoid dehydration. Side effects include yeast infection, UTI, and lightheadedness. May lower blood pressure a few points. May lose some weight. Stop 2 days  prior to surgery and resume 2 days later. Go to ER if nausea and vomiting. Cannot go on no carbohydrate diet. We expect there to be positive glucose in your urine samples now.     Switch to metformin  mg tablets. To avoid diarrhea from metformin, can try 7-13 grams/day of psyllium fiber like sugar-free Metamucil and take metformin with food. Fiber can also lower cholesterol.    Metformin start up:    Start with one tablet with breakfast for one week.    2nd week. Add 2nd tablet with supper.    3rd week: 2 tablets with breakfast and 1 tablet with supper    4th week: 2 tablets with breakfast and 2 tablets with supper.

## 2018-08-17 ENCOUNTER — TELEPHONE (OUTPATIENT)
Dept: ENDOCRINOLOGY | Facility: CLINIC | Age: 43
End: 2018-08-17

## 2018-08-20 ENCOUNTER — OFFICE VISIT (OUTPATIENT)
Dept: ENDOCRINOLOGY | Facility: CLINIC | Age: 43
End: 2018-08-20
Payer: COMMERCIAL

## 2018-08-20 VITALS
WEIGHT: 244 LBS | BODY MASS INDEX: 33.05 KG/M2 | HEART RATE: 83 BPM | SYSTOLIC BLOOD PRESSURE: 128 MMHG | HEIGHT: 72 IN | DIASTOLIC BLOOD PRESSURE: 88 MMHG

## 2018-08-20 DIAGNOSIS — R80.9 MICROALBUMINURIA: ICD-10-CM

## 2018-08-20 DIAGNOSIS — E66.9 NON MORBID OBESITY, UNSPECIFIED OBESITY TYPE: ICD-10-CM

## 2018-08-20 DIAGNOSIS — I10 ESSENTIAL HYPERTENSION: ICD-10-CM

## 2018-08-20 PROCEDURE — 3045F PR MOST RECENT HEMOGLOBIN A1C LEVEL 7.0-9.0%: CPT | Mod: CPTII,S$GLB,, | Performed by: NURSE PRACTITIONER

## 2018-08-20 PROCEDURE — 3008F BODY MASS INDEX DOCD: CPT | Mod: CPTII,S$GLB,, | Performed by: NURSE PRACTITIONER

## 2018-08-20 PROCEDURE — 99214 OFFICE O/P EST MOD 30 MIN: CPT | Mod: S$GLB,,, | Performed by: NURSE PRACTITIONER

## 2018-08-20 PROCEDURE — 3074F SYST BP LT 130 MM HG: CPT | Mod: CPTII,S$GLB,, | Performed by: NURSE PRACTITIONER

## 2018-08-20 PROCEDURE — 99999 PR PBB SHADOW E&M-EST. PATIENT-LVL IV: CPT | Mod: PBBFAC,,, | Performed by: NURSE PRACTITIONER

## 2018-08-20 PROCEDURE — 3079F DIAST BP 80-89 MM HG: CPT | Mod: CPTII,S$GLB,, | Performed by: NURSE PRACTITIONER

## 2018-08-20 NOTE — PROGRESS NOTES
CC: This 43 y.o. White male  is here for evaluation of  T2DM along with comorbidities indicated in the Visit Diagnosis section of this encounter.    HPI: Enrique Grande was diagnosed with T2DM in his 30s. Metformin started at the time of diagnosis. + family hx of T2DM - mother, maternal aunt, sister, maternal GM, many cousins. Denies complications from DM in the family.     Pt sees therapist (Sherly Berger) weekly and a psychiatrist Dr. Jimenez at [facility] every 1-3 months.       Initial visit on 7/16/18  New to Endocrine. Referred by Dr. Humphreys.   Hasn't taken any metformin for weeks because of diarrhea. However, diarrhea continued while he was off it so he started a probiotic which has helped and he hasn't had any diarrhea recently.    Pt stopped taking glipizide - didn't find it was that helpful. Pt finds it difficult to manage polypharmacy.   Plan Switch to metformin  mg tablets. Titrate to 1000 mg bid as tolerated.   Start Jardiance 10 mg once daily.   Continue Lantus twice daily.   Test blood sugar 2x/day.   Increase exercise.   Ideally eat 3 balanced meals/day. Pt declines RD visit.   Return to clinic in 4 weeks.       Interval history  Tolerating metformin XR better. Maybe having constipation but he is vague re: details.   No dizziness or  symptoms on Jardiance.       LAST DIABETES EDUCATION: years ago     HOSPITALIZED FOR DIABETES -  No.    PRESCRIBED DIABETES MEDICATIONS: metformin XR 1000 mg bid, Lantus Solostar 32 units bid, Jardiance 10 mg once daily   Misses medication doses - no     DM COMPLICATIONS: none     SIGNIFICANT DIABETES MED HISTORY: has tried Victoza and Byetta but did not find them helpful and c/o GI s/e.     SELF MONITORING BLOOD GLUCOSE: uses accuchek angie. Checks blood glucose at home 0-2x/day.             HYPOGLYCEMIC EPISODES: denies      CURRENT DIET: drinks water, club soda, nothing with sugar. Eats now that he is back to work 3 meals/day.     CURRENT EXERCISE: none - has  been busy with work and trying to sell house; has gym membership. Walks dog once daily - 10-30 minutes each time.     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 No acute distress.   PSYCH: AAOx3,  conversant, well-groomed. Judgement and insight good. Pleasant, + anxious   NEURO: Cranial nerves grossly intact. Speech clear, no tremor.   CHEST: Respirations even and unlabored.   MS: Gait steady. No clubbing.         Hemoglobin A1C   Date Value Ref Range Status   06/11/2018 8.8 (H) 4.0 - 5.6 % Final     Comment:     ADA Screening Guidelines:  5.7-6.4%  Consistent with prediabetes  >or=6.5%  Consistent with diabetes  High levels of fetal hemoglobin interfere with the HbA1C  assay. Heterozygous hemoglobin variants (HbS, HgC, etc)do  not significantly interfere with this assay.   However, presence of multiple variants may affect accuracy.     01/15/2018 8.9 (H) 4.0 - 5.6 % Final     Comment:     According to ADA guidelines, hemoglobin A1c <7.0% represents  optimal control in non-pregnant diabetic patients. Different  metrics may apply to specific patient populations.   Standards of Medical Care in Diabetes-2016.  For the purpose of screening for the presence of diabetes:  <5.7%     Consistent with the absence of diabetes  5.7-6.4%  Consistent with increasing risk for diabetes   (prediabetes)  >or=6.5%  Consistent with diabetes  Currently, no consensus exists for use of hemoglobin A1c  for diagnosis of diabetes for children.  This Hemoglobin A1c assay has significant interference with fetal   hemoglobin   (HbF). The results are  invalid for patients with abnormal amounts of   HbF,   including those with known Hereditary Persistence   of Fetal Hemoglobin. Heterozygous hemoglobin variants (HbAS, HbAC,   HbAD, HbAE, HbA2) do not significantly interfere with this assay;   however, presence of multiple variants in a sample may impact the %   interference.     01/28/2015 7.7 (H) 4.5 - 6.2 % Final           Chemistry        Component Value Date/Time     06/11/2018 0854    K 4.1 06/11/2018 0854     06/11/2018 0854    CO2 26 06/11/2018 0854    BUN 15 06/11/2018 0854    CREATININE 0.9 06/11/2018 0854     (H) 06/11/2018 0854        Component Value Date/Time    CALCIUM 9.7 06/11/2018 0854    ALKPHOS 74 06/11/2018 0854    AST 17 06/11/2018 0854    ALT 26 06/11/2018 0854    BILITOT 0.3 06/11/2018 0854    ESTGFRAFRICA >60 06/11/2018 0854    EGFRNONAA >60 06/11/2018 0854          Lab Results   Component Value Date    LDLCALC 66.6 01/15/2018        Ref. Range 1/15/2018 08:48   Cholesterol Latest Ref Range: 120 - 199 mg/dL 149   HDL Latest Ref Range: 40 - 75 mg/dL 37 (L)   LDL Cholesterol Latest Ref Range: 63.0 - 159.0 mg/dL 66.6   Total Cholesterol/HDL Ratio Latest Ref Range: 2.0 - 5.0  4.0   Triglycerides Latest Ref Range: 30 - 150 mg/dL 227 (H)     Lab Results   Component Value Date    MICALBCREAT 48.7 (H) 01/15/2018       STANDARDS of CARE:        ASA:               Last eye exam:       ASSESSMENT and PLAN:    A1C GOAL: < 7 %       1. Uncontrolled type 2 diabetes mellitus without complication, with long-term current use of insulin  Continue current regimen. Most glucoses at goal.   Test bg 2x/day. rtc in 2 mo with labs prior.     Hemoglobin A1c   2. Essential hypertension  Pt has not been taking hctz. Will hold off renewing rx for now and reassess at next ov.   Please check blood pressure twice a day a couple of times a week. The goal is < 130/80.    3. Non morbid obesity, unspecified obesity type  Increases insulin resistance.      4.  Microalbuminuria  Continue lisinopril 10 mg.        Orders Placed This Encounter   Procedures    Hemoglobin A1c     Standing Status:   Future     Standing Expiration Date:   10/19/2019        Follow-up in about 2 months (around 10/20/2018).

## 2018-10-16 RX ORDER — METFORMIN HYDROCHLORIDE 500 MG/1
1000 TABLET, EXTENDED RELEASE ORAL 2 TIMES DAILY WITH MEALS
Qty: 360 TABLET | Refills: 0 | Status: SHIPPED | OUTPATIENT
Start: 2018-10-16 | End: 2018-10-23 | Stop reason: SDUPTHER

## 2018-10-23 ENCOUNTER — OFFICE VISIT (OUTPATIENT)
Dept: ENDOCRINOLOGY | Facility: CLINIC | Age: 43
End: 2018-10-23
Payer: COMMERCIAL

## 2018-10-23 ENCOUNTER — LAB VISIT (OUTPATIENT)
Dept: LAB | Facility: HOSPITAL | Age: 43
End: 2018-10-23
Payer: COMMERCIAL

## 2018-10-23 VITALS
WEIGHT: 240.06 LBS | DIASTOLIC BLOOD PRESSURE: 76 MMHG | HEART RATE: 67 BPM | HEIGHT: 72 IN | BODY MASS INDEX: 32.52 KG/M2 | SYSTOLIC BLOOD PRESSURE: 120 MMHG

## 2018-10-23 DIAGNOSIS — I10 ESSENTIAL HYPERTENSION: ICD-10-CM

## 2018-10-23 DIAGNOSIS — E78.1 HYPERTRIGLYCERIDEMIA: ICD-10-CM

## 2018-10-23 DIAGNOSIS — R80.9 MICROALBUMINURIA: ICD-10-CM

## 2018-10-23 DIAGNOSIS — E66.9 NON MORBID OBESITY, UNSPECIFIED OBESITY TYPE: ICD-10-CM

## 2018-10-23 LAB
CHOLEST SERPL-MCNC: 129 MG/DL
CHOLEST/HDLC SERPL: 3.9 {RATIO}
ESTIMATED AVG GLUCOSE: 131 MG/DL
HBA1C MFR BLD HPLC: 6.2 %
HDLC SERPL-MCNC: 33 MG/DL
HDLC SERPL: 25.6 %
LDLC SERPL CALC-MCNC: 69.2 MG/DL
NONHDLC SERPL-MCNC: 96 MG/DL
TRIGL SERPL-MCNC: 134 MG/DL

## 2018-10-23 PROCEDURE — 99999 PR PBB SHADOW E&M-EST. PATIENT-LVL IV: CPT | Mod: PBBFAC,,, | Performed by: NURSE PRACTITIONER

## 2018-10-23 PROCEDURE — 99214 OFFICE O/P EST MOD 30 MIN: CPT | Mod: S$GLB,,, | Performed by: NURSE PRACTITIONER

## 2018-10-23 PROCEDURE — 3045F PR MOST RECENT HEMOGLOBIN A1C LEVEL 7.0-9.0%: CPT | Mod: CPTII,S$GLB,, | Performed by: NURSE PRACTITIONER

## 2018-10-23 PROCEDURE — 3074F SYST BP LT 130 MM HG: CPT | Mod: CPTII,S$GLB,, | Performed by: NURSE PRACTITIONER

## 2018-10-23 PROCEDURE — 83036 HEMOGLOBIN GLYCOSYLATED A1C: CPT

## 2018-10-23 PROCEDURE — 36415 COLL VENOUS BLD VENIPUNCTURE: CPT | Mod: PN

## 2018-10-23 PROCEDURE — 3008F BODY MASS INDEX DOCD: CPT | Mod: CPTII,S$GLB,, | Performed by: NURSE PRACTITIONER

## 2018-10-23 PROCEDURE — 90686 IIV4 VACC NO PRSV 0.5 ML IM: CPT | Mod: S$GLB,,, | Performed by: NURSE PRACTITIONER

## 2018-10-23 PROCEDURE — 80061 LIPID PANEL: CPT

## 2018-10-23 PROCEDURE — 3078F DIAST BP <80 MM HG: CPT | Mod: CPTII,S$GLB,, | Performed by: NURSE PRACTITIONER

## 2018-10-23 RX ORDER — INSULIN GLARGINE 100 [IU]/ML
32 INJECTION, SOLUTION SUBCUTANEOUS 2 TIMES DAILY
Qty: 4 BOX | Refills: 0 | Status: SHIPPED | OUTPATIENT
Start: 2018-10-23 | End: 2018-10-23 | Stop reason: SDUPTHER

## 2018-10-23 RX ORDER — INSULIN GLARGINE 100 [IU]/ML
32 INJECTION, SOLUTION SUBCUTANEOUS 2 TIMES DAILY
Qty: 4 BOX | Refills: 0 | Status: SHIPPED | OUTPATIENT
Start: 2018-10-23 | End: 2019-03-01 | Stop reason: SDUPTHER

## 2018-10-23 RX ORDER — METFORMIN HYDROCHLORIDE 500 MG/1
1000 TABLET, EXTENDED RELEASE ORAL 2 TIMES DAILY WITH MEALS
Qty: 360 TABLET | Refills: 0 | Status: SHIPPED | OUTPATIENT
Start: 2018-10-23 | End: 2019-04-15 | Stop reason: SDUPTHER

## 2018-10-23 NOTE — PROGRESS NOTES
inflCC: This 43 y.o. White male  is here for evaluation of  T2DM along with comorbidities indicated in the Visit Diagnosis section of this encounter.    HPI: Enrique Grande was diagnosed with T2DM in his 30s. Metformin started at the time of diagnosis. + family hx of T2DM - mother, maternal aunt, sister, maternal GM, many cousins. Denies complications from DM in the family.     Pt sees therapist (Sherly Berger) weekly and a psychiatrist Dr. Jimenez at [facility] every 1-3 months.       Prior visit on 8/20/18  Tolerating metformin XR better. Maybe having constipation but he is vague re: details.   No dizziness or  symptoms on Jardiance.   Plan Continue current regimen. Most glucoses at goal.   Test bg 2x/day. rtc in 2 mo with labs prior.     Interval history  No recent labs available.   Has lost 4 lb since lov.   Trying to sell house.     LAST DIABETES EDUCATION: years ago     HOSPITALIZED FOR DIABETES -  No.    PRESCRIBED DIABETES MEDICATIONS: metformin XR 1000 mg bid, Lantus Solostar 32 units bid, Jardiance 10 mg once daily   Misses medication doses - no     DM COMPLICATIONS: none     SIGNIFICANT DIABETES MED HISTORY:   has tried Victoza and Byetta but did not find them helpful and c/o GI s/e.   Patient didn't find glipizide was helpful  jardiance started at initial ov 7/2018      SELF MONITORING BLOOD GLUCOSE: uses freestyle lite meter. Checks blood glucose at home infrequent. His meter broke. Cannot recall any BG values.       HYPOGLYCEMIC EPISODES: denies      CURRENT DIET: drinks water, club soda, nothing with sugar. Eats now that he is back to work 3 meals/day.     CURRENT EXERCISE: none - has been busy with work and trying to sell house; has gym membership. Walks dog once daily - 10-30 minutes each time.     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 No acute distress.   PSYCH: AAOx3,  conversant, well-groomed. Judgement and insight good. Pleasant, + anxious   NEURO: Cranial nerves grossly intact. Speech clear, no tremor.   CHEST: Respirations even and unlabored.   MS: Gait steady. No clubbing.         Hemoglobin A1C   Date Value Ref Range Status   06/11/2018 8.8 (H) 4.0 - 5.6 % Final     Comment:     ADA Screening Guidelines:  5.7-6.4%  Consistent with prediabetes  >or=6.5%  Consistent with diabetes  High levels of fetal hemoglobin interfere with the HbA1C  assay. Heterozygous hemoglobin variants (HbS, HgC, etc)do  not significantly interfere with this assay.   However, presence of multiple variants may affect accuracy.     01/15/2018 8.9 (H) 4.0 - 5.6 % Final     Comment:     According to ADA guidelines, hemoglobin A1c <7.0% represents  optimal control in non-pregnant diabetic patients. Different  metrics may apply to specific patient populations.   Standards of Medical Care in Diabetes-2016.  For the purpose of screening for the presence of diabetes:  <5.7%     Consistent with the absence of diabetes  5.7-6.4%  Consistent with increasing risk for diabetes   (prediabetes)  >or=6.5%  Consistent with diabetes  Currently, no consensus exists for use of hemoglobin A1c  for diagnosis of diabetes for children.  This Hemoglobin A1c assay has significant interference with fetal   hemoglobin   (HbF). The results are invalid for patients with abnormal amounts of   HbF,   including those with known Hereditary Persistence   of Fetal Hemoglobin. Heterozygous hemoglobin variants (HbAS, HbAC,   HbAD, HbAE, HbA2) do not significantly interfere with this assay;   however, presence of multiple variants in a sample may impact the %   interference.      01/28/2015 7.7 (H) 4.5 - 6.2 % Final           Chemistry        Component Value Date/Time     06/11/2018 0854    K 4.1 06/11/2018 0854     06/11/2018 0854    CO2 26 06/11/2018 0854    BUN 15 06/11/2018 0854    CREATININE 0.9 06/11/2018 0854     (H) 06/11/2018 0854        Component Value Date/Time    CALCIUM 9.7 06/11/2018 0854    ALKPHOS 74 06/11/2018 0854    AST 17 06/11/2018 0854    ALT 26 06/11/2018 0854    BILITOT 0.3 06/11/2018 0854    ESTGFRAFRICA >60 06/11/2018 0854    EGFRNONAA >60 06/11/2018 0854          Lab Results   Component Value Date    LDLCALC 66.6 01/15/2018        Ref. Range 1/15/2018 08:48   Cholesterol Latest Ref Range: 120 - 199 mg/dL 149   HDL Latest Ref Range: 40 - 75 mg/dL 37 (L)   LDL Cholesterol Latest Ref Range: 63.0 - 159.0 mg/dL 66.6   Total Cholesterol/HDL Ratio Latest Ref Range: 2.0 - 5.0  4.0   Triglycerides Latest Ref Range: 30 - 150 mg/dL 227 (H)     Lab Results   Component Value Date    MICALBCREAT 48.7 (H) 01/15/2018       STANDARDS of CARE:        ASA:               Last eye exam:       ASSESSMENT and PLAN:    A1C GOAL: < 7 %       1. Uncontrolled type 2 diabetes mellitus without complication, with long-term current use of insulin  Labs today. Will call you with results.   Test blood sugar daily. And bring logs to visit. Unable to optimize diabetes care without written blood sugar logs.       Influenza - Quadrivalent (3 years & older) (PF)   2. Essential hypertension  controlled   3. Non morbid obesity, unspecified obesity type  Mild weight loss noted    4. Microalbuminuria  Microalbumin/creatinine urine ratio   5. Hypertriglyceridemia  Lipid panel           Orders Placed This Encounter   Procedures    Influenza - Quadrivalent (3 years & older) (PF)    Lipid panel     Standing Status:   Future     Number of Occurrences:   1     Standing Expiration Date:   10/23/2019    Microalbumin/creatinine urine ratio     Standing Status:   Future     Number of  Occurrences:   1     Standing Expiration Date:   12/22/2019     Order Specific Question:   Specimen Source     Answer:   Urine        No Follow-up on file.

## 2018-10-23 NOTE — PATIENT INSTRUCTIONS
Labs today. Will call you with results.   Test blood sugar daily. And bring logs to visit. Unable to optimize diabetes care without written blood sugar logs.

## 2018-10-23 NOTE — PROGRESS NOTES
Administered Low Dose Influenza Vaccine to patient. Patient tolerated it well, and was advised to wait 15 minutes before leaving clinic to assure no adverse effects. Patient verbalized understanding.

## 2018-10-24 ENCOUNTER — TELEPHONE (OUTPATIENT)
Dept: ENDOCRINOLOGY | Facility: CLINIC | Age: 43
End: 2018-10-24

## 2018-10-24 NOTE — TELEPHONE ENCOUNTER
----- Message from Chloe Fitzgerald NP sent at 10/24/2018  7:08 AM CDT -----  Your A1c has decreased to 6.2%, which indicates well controlled diabetes. Your cholesterol and triglyceride levels are good as well. Please continue current medications. You may follow up with Dr. Humphreys in Primary Care for diabetes management. I recommend making a follow-up in about 6 months.    Your urine result is normal and indicates no sign of kidney damage from diabetes.

## 2018-10-26 ENCOUNTER — TELEPHONE (OUTPATIENT)
Dept: ENDOCRINOLOGY | Facility: CLINIC | Age: 43
End: 2018-10-26

## 2018-10-26 NOTE — TELEPHONE ENCOUNTER
----- Message from Chloe Fitzgerald NP sent at 10/26/2018  9:27 AM CDT -----  Contact: Jeevan Chino - see below.     ----- Message -----  From: Kirsten Morillo MA  Sent: 10/26/2018   8:22 AM  To: Chloe Fitzgerald NP        ----- Message -----  From: Leisa Payan  Sent: 10/26/2018   8:20 AM  To: Lia Corona Staff    Patient is calling for lab results. He is asking to email them to him at [email or web address]. Please call back at [phone].

## 2018-10-26 NOTE — TELEPHONE ENCOUNTER
Called patient and discussed lab results per providers note below. Patient verbalized understanding.

## 2018-11-09 RX ORDER — EMPAGLIFLOZIN 10 MG/1
TABLET, FILM COATED ORAL
Qty: 90 TABLET | Refills: 1 | Status: SHIPPED | OUTPATIENT
Start: 2018-11-09 | End: 2019-03-01 | Stop reason: SDUPTHER

## 2018-11-28 RX ORDER — DEXTROAMPHETAMINE SACCHARATE, AMPHETAMINE ASPARTATE, DEXTROAMPHETAMINE SULFATE AND AMPHETAMINE SULFATE 7.5; 7.5; 7.5; 7.5 MG/1; MG/1; MG/1; MG/1
TABLET ORAL
Refills: 0 | OUTPATIENT
Start: 2018-11-28

## 2018-12-04 ENCOUNTER — NURSE TRIAGE (OUTPATIENT)
Dept: ADMINISTRATIVE | Facility: CLINIC | Age: 43
End: 2018-12-04

## 2018-12-04 ENCOUNTER — HOSPITAL ENCOUNTER (EMERGENCY)
Facility: HOSPITAL | Age: 43
Discharge: HOME | End: 2018-12-04
Attending: EMERGENCY MEDICINE
Payer: COMMERCIAL

## 2018-12-04 VITALS
DIASTOLIC BLOOD PRESSURE: 83 MMHG | WEIGHT: 245 LBS | HEART RATE: 92 BPM | OXYGEN SATURATION: 100 % | RESPIRATION RATE: 16 BRPM | BODY MASS INDEX: 33.23 KG/M2 | TEMPERATURE: 98 F | SYSTOLIC BLOOD PRESSURE: 157 MMHG

## 2018-12-04 DIAGNOSIS — I10 HYPERTENSION, UNSPECIFIED TYPE: Primary | ICD-10-CM

## 2018-12-04 LAB — POCT GLUCOSE: 149 MG/DL (ref 70–110)

## 2018-12-04 PROCEDURE — 99283 EMERGENCY DEPT VISIT LOW MDM: CPT

## 2018-12-04 RX ORDER — LISINOPRIL 10 MG/1
10 TABLET ORAL DAILY
Qty: 30 TABLET | Refills: 0 | Status: SHIPPED | OUTPATIENT
Start: 2018-12-04 | End: 2019-01-03 | Stop reason: SDUPTHER

## 2018-12-04 NOTE — DISCHARGE INSTRUCTIONS
You must follow a low-salt diet.  Check and record your blood pressure daily and bring to your doctor.  Continue taking her medication as prescribed.  Return here as needed

## 2018-12-04 NOTE — TELEPHONE ENCOUNTER
Reason for Disposition   Patient sounds very sick or weak to the triager    Protocols used: ST HIGH BLOOD PRESSURE-A-OH    146/111 pulse 86 @0903    Enrique reports the blood pressure above. He states he took b/p because he was feeling lightheaded. He states he feels unsteady on his feet because of how lightheaded he feels. He states that he is taking his bp meds appropriately but the jardiance he is not taking due to an insurance issue. Recommended he be seen in ED and let another adult to drive. He agrees to plan. Please contact caller with any further care advice.

## 2018-12-04 NOTE — ED PROVIDER NOTES
"Encounter Date: 12/4/2018    SCRIBE #1 NOTE: I, Gab Perdomo, am scribing for, and in the presence of,  Dr. Watson. I have scribed the following portions of the note - Other sections scribed: HPI, ROS, PE.       History     Chief Complaint   Patient presents with    Hypertension     Pt states," My bllod pressure has been running high and I want to get checked out."      This is a 43 y.o. male who presents to the ED with a complaint of lightheadedness that began one hour ago. Pt has a PMHx of HTN, but normally does not feel symptoms. His BP was 148/111 PTA, but his unsure what his BP normally runs. Pt has been compliant with Lisinopril. Pt reports nausea and vomiting (thinks this is due to Metformin). He denies SOB, CP, abdominal pain, vomiting, dizziness, weakness, decreased urine, back pain, neck pain, gait or speech difficulty, or weakness/ numbness. Pt admits to feeling anxious.          The history is provided by the patient.     Review of patient's allergies indicates:  No Known Allergies  Past Medical History:   Diagnosis Date    Anxiety     AR (allergic rhinitis)     Depression     Diabetes mellitus, type 2 2005    GERD (gastroesophageal reflux disease)     History of psychiatric hospitalization     Hx of psychiatric care     Hypertension     Psoriasis     Psychiatric problem     Therapy      Past Surgical History:   Procedure Laterality Date    ELECTROCONVULSIVE THERAPY (ECT) - SINGLE SEIZURE N/A 7/17/2015    Performed by [facility] E[facility]    ELECTROCONVULSIVE THERAPY (ECT) - SINGLE SEIZURE N/A 7/16/2015    Per[facility] by [facility]omo Jr., [facility] at [facility] ECT    ELECTROCONVULSIVE THERAPY (ECT) - SINGLE SEIZURE N/A 7/15/2015    [facility] by [facility] at [facility] ECT    ELECTROCONVULSIVE THERAPY (ECT) - SINGLE SEIZURE N/A 7/14/2015    Performed by [facility] at [facility] ECT    ELECTROCONVULSIVE THERAPY (ECT) - SINGLE SEIZURE N/A 7/13/2015    Per[facility] " [facility]    FOREARM SURGERY      deep laceration, with nerve and tendon injuries     Family History   Problem Relation Age of Onset    Diabetes Mother     Alcohol abuse Father     Diabetes Sister     Diabetes Maternal Aunt     Alcohol abuse Paternal Uncle     Alcohol abuse Paternal Grandfather     Diabetes Maternal Grandmother     Suicide Neg Hx     Schizophrenia Neg Hx     Bipolar disorder Neg Hx     Dementia Neg Hx      Social History     Tobacco Use    Smoking status: Never Smoker    Smokeless tobacco: Never Used   Substance Use Topics    Alcohol use: Yes     Alcohol/week: 1.8 oz     Types: 3 Glasses of wine per week     Comment: socially    Drug use: No     Review of Systems   Respiratory: Negative for shortness of breath.    Cardiovascular: Negative for chest pain and leg swelling.   Gastrointestinal: Positive for diarrhea and nausea. Negative for abdominal pain and vomiting.   Genitourinary: Negative for decreased urine volume.   Musculoskeletal: Negative for back pain, gait problem and neck pain.   Neurological: Positive for light-headedness. Negative for dizziness, speech difficulty, weakness and numbness.   Psychiatric/Behavioral: The patient is nervous/anxious.        Physical Exam     Initial Vitals [12/04/18 1007]   BP Pulse Resp Temp SpO2   (!) 157/83 92 16 98 °F (36.7 °C) 100 %      MAP       --         Physical Exam    Nursing note and vitals reviewed.  Constitutional: He appears well-developed and well-nourished.   HENT:   Head: Normocephalic and atraumatic.   Mouth/Throat: Uvula is midline, oropharynx is clear and moist and mucous membranes are normal.   Cardiovascular: Normal rate, regular rhythm, normal heart sounds and intact distal pulses. Exam reveals no gallop and no friction rub.    No murmur heard.  Pulmonary/Chest: Breath sounds normal. No respiratory distress. He has no wheezes. He has no rhonchi. He has no rales. He exhibits no tenderness.    Neurological: He is alert and oriented to person, place, and time. He has normal strength. No cranial nerve deficit or sensory deficit. GCS score is 15. GCS eye subscore is 4. GCS verbal subscore is 5. GCS motor subscore is 6.   Skin: Skin is warm and dry. Capillary refill takes less than 2 seconds.   Psychiatric: His behavior is normal. His mood appears anxious.         ED Course   Procedures  Labs Reviewed   POCT GLUCOSE - Abnormal; Notable for the following components:       Result Value    POCT Glucose 149 (*)     All other components within normal limits   POCT GLUCOSE MONITORING CONTINUOUS          Imaging Results    None          Medical Decision Making:   Initial Assessment:   This is a 43 y.o. male who presents to the ED with a complaint of lightheadedness, nauesa, diarrhea, and mild anxiety, but denies SOB, CP, abdominal pain, vomiting, dizziness, weakness, decreased urine, back pain, neck pain, gait or speech difficulty, or weakness/ numbness.    Pt's physical exam is significant for anxiety.       ED Management:  Patient's blood pressure is only slightly elevated.  He has no evidence of end-organ damage.  Patient was reassured.  He was advised on a low-salt diet and the need to check and record his blood pressure on a regular basis and bring those readings to his doctor.  He does not require emergent treatment of his blood pressure            Scribe Attestation:   Scribe #1: I performed the above scribed service and the documentation accurately describes the services I performed. I attest to the accuracy of the note.    *     I, Dr. Sandra Watson, personally performed the services described in this documentation. All medical record entries made by the scribe were at my direction and in my presence.  I have reviewed the chart and agree that the record reflects my personal performance and is accurate and complete. Sandra Watson MD.  11:57 AM 12/04/2018          Clinical Impression:     1.  Hypertension, unspecified type                                  Sandra Watson MD  12/04/18 9602

## 2019-01-03 ENCOUNTER — OFFICE VISIT (OUTPATIENT)
Dept: FAMILY MEDICINE | Facility: CLINIC | Age: 44
End: 2019-01-03
Payer: COMMERCIAL

## 2019-01-03 VITALS
DIASTOLIC BLOOD PRESSURE: 84 MMHG | HEART RATE: 81 BPM | SYSTOLIC BLOOD PRESSURE: 136 MMHG | BODY MASS INDEX: 32.88 KG/M2 | RESPIRATION RATE: 17 BRPM | HEIGHT: 72 IN | OXYGEN SATURATION: 96 % | TEMPERATURE: 98 F | WEIGHT: 242.75 LBS

## 2019-01-03 DIAGNOSIS — I10 ESSENTIAL HYPERTENSION: Primary | ICD-10-CM

## 2019-01-03 PROCEDURE — 3079F PR MOST RECENT DIASTOLIC BLOOD PRESSURE 80-89 MM HG: ICD-10-PCS | Mod: CPTII,S$GLB,, | Performed by: NURSE PRACTITIONER

## 2019-01-03 PROCEDURE — 99214 OFFICE O/P EST MOD 30 MIN: CPT | Mod: S$GLB,,, | Performed by: NURSE PRACTITIONER

## 2019-01-03 PROCEDURE — 3079F DIAST BP 80-89 MM HG: CPT | Mod: CPTII,S$GLB,, | Performed by: NURSE PRACTITIONER

## 2019-01-03 PROCEDURE — 99999 PR PBB SHADOW E&M-EST. PATIENT-LVL III: CPT | Mod: PBBFAC,,, | Performed by: NURSE PRACTITIONER

## 2019-01-03 PROCEDURE — 99999 PR PBB SHADOW E&M-EST. PATIENT-LVL III: ICD-10-PCS | Mod: PBBFAC,,, | Performed by: NURSE PRACTITIONER

## 2019-01-03 PROCEDURE — 3044F PR MOST RECENT HEMOGLOBIN A1C LEVEL <7.0%: ICD-10-PCS | Mod: CPTII,S$GLB,, | Performed by: NURSE PRACTITIONER

## 2019-01-03 PROCEDURE — 99214 PR OFFICE/OUTPT VISIT, EST, LEVL IV, 30-39 MIN: ICD-10-PCS | Mod: S$GLB,,, | Performed by: NURSE PRACTITIONER

## 2019-01-03 PROCEDURE — 3008F BODY MASS INDEX DOCD: CPT | Mod: CPTII,S$GLB,, | Performed by: NURSE PRACTITIONER

## 2019-01-03 PROCEDURE — 3044F HG A1C LEVEL LT 7.0%: CPT | Mod: CPTII,S$GLB,, | Performed by: NURSE PRACTITIONER

## 2019-01-03 PROCEDURE — 3075F SYST BP GE 130 - 139MM HG: CPT | Mod: CPTII,S$GLB,, | Performed by: NURSE PRACTITIONER

## 2019-01-03 PROCEDURE — 3008F PR BODY MASS INDEX (BMI) DOCUMENTED: ICD-10-PCS | Mod: CPTII,S$GLB,, | Performed by: NURSE PRACTITIONER

## 2019-01-03 PROCEDURE — 3075F PR MOST RECENT SYSTOLIC BLOOD PRESS GE 130-139MM HG: ICD-10-PCS | Mod: CPTII,S$GLB,, | Performed by: NURSE PRACTITIONER

## 2019-01-03 RX ORDER — OXYCODONE AND ACETAMINOPHEN 5; 325 MG/1; MG/1
1 TABLET ORAL
COMMUNITY
Start: 2014-06-06

## 2019-01-03 RX ORDER — VORTIOXETINE 20 MG/1
1 TABLET, FILM COATED ORAL DAILY
Refills: 11 | COMMUNITY
Start: 2018-12-17

## 2019-01-03 RX ORDER — LOSARTAN POTASSIUM 25 MG/1
25 TABLET ORAL DAILY
Qty: 90 TABLET | Refills: 3 | Status: SHIPPED | OUTPATIENT
Start: 2019-01-03 | End: 2019-02-06

## 2019-01-03 RX ORDER — ATORVASTATIN CALCIUM 20 MG/1
20 TABLET, FILM COATED ORAL DAILY
Qty: 30 TABLET | Refills: 3 | Status: SHIPPED | OUTPATIENT
Start: 2019-01-03 | End: 2019-04-29 | Stop reason: SDUPTHER

## 2019-01-03 RX ORDER — LISDEXAMFETAMINE DIMESYLATE 70 MG/1
70 CAPSULE ORAL EVERY MORNING
Refills: 0 | COMMUNITY
Start: 2018-11-19

## 2019-01-03 NOTE — PROGRESS NOTES
"Routine Office Visit    Patient Name: Enrique Grande    : 1975  MRN: [id number]    Chief Complaint:  Elevated BP    Subjective:  Enrique is a 43 y.o. male who presents today for:    1. He presents today to discuss his medications. He reports that he has been feeling confused about his medications in regards to when to take them at what time. He states that he has been taking his lisinopril inconsistently and recently was seen in the ED because of elevated BP readings at home. He states that he takes his BP at home with readings in 140s/90s and sometimes higher. He says he was recently placed on methotrexate and folic acid by dermatologist as well for psorioatic arthritis. Compounding his issues he states that he had "high blood loss" previously which has "caused some cognitive issues." He has taken his lisinopril today.    He has OCD and major depressive disorder follow by outside psychiatrist currently on trentellix, lithium, vyvanse, and clonazepam nightly. He states that his depression is stable denies any SI/HI or feeling down, depressed, or hopeless.    He denies chest pain, palpitations, NV, SOB, wheezing. Does report occasional diarrhea that he attributes to metformin and occasional tension headaches that he attributes to stress.     He also needs refills on his lipitor.     Past Medical History  Past Medical History:   Diagnosis Date    Anxiety     AR (allergic rhinitis)     Depression     Diabetes mellitus, type 2     GERD (gastroesophageal reflux disease)     History of psychiatric hospitalization     Hx of psychiatric care     Hypertension     Psoriasis     Psychiatric problem     Therapy        Past Surgical History  Past Surgical History:   Procedure Laterality Date    ELECTROCONVULSIVE THERAPY (ECT) - SINGLE SEIZURE N/A 2015    [facility]    ELECTROCONVULSIVE THERAPY (ECT) - SINGLE SEIZURE N/A 2015    Performed by Scott BURNHAM" Dewey [facility]wood [facility]    ELECTROCONVULSIVE THERAPY (ECT) - SINGLE SEIZURE N/A 7/15/2015    Performed by [facility] Jr[facility] at [facility] ECT    ELECTROCONVULSIVE THERAPY (ECT) - SINGLE SEIZURE N/A 7/14/2015    Performed by [facility] Jr[facility] at [facility] ECT    ELECTROCONVULSIVE THERAPY (ECT) - SINGLE SEIZURE N/A 7/13/2015    Per[facility] by [facility] at [facility] ECT    FOREARM SURGERY      deep laceration, with nerve and tendon injuries       Family History  Family History   Problem Relation Age of Onset    Diabetes Mother     Alcohol abuse Father     Diabetes Sister     Diabetes Maternal Aunt     Alcohol abuse Paternal Uncle     Alcohol abuse Paternal Grandfather     Diabetes Maternal Grandmother     Suicide Neg Hx     Schizophrenia Neg Hx     Bipolar disorder Neg Hx     Dementia Neg Hx        Social History  Social History     Socioeconomic History    Marital status: Single     Spouse name: Not on file    Number of children: 0    Years of education: Not on file    Highest education level: Not on file   Social Needs    Financial resource strain: Not on file    Food insecurity - worry: Not on file    Food insecurity - inability: Not on file    Transportation needs - medical: Not on file    Transportation needs - non-medical: Not on file   Occupational History    Occupation: teacher     Comment: [employer]   Tobacco Use    Smoking status: Never Smoker    Smokeless tobacco: Never Used   Substance and Sexual Activity    Alcohol use: Yes     Alcohol/week: 1.8 oz     Types: 3 Glasses of wine per week     Comment: socially    Drug use: No    Sexual activity: Not on file   Other Topics Concern    Patient feels they ought to cut down on drinking/drug use Not Asked    Patient annoyed by others criticizing their drinking/drug use Not Asked    Patient has felt bad or guilty about drinking/drug use Not Asked    Patient has had a drink/used drugs as an eye opener in the AM Not  "Asked   Social History Narrative    The patient does not exercise regularly ().  Rates diet as good.  He is not satisfied with weight.        Pt is the 3rd child and only boy of 5 children from parents who  when he was very young.  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  Hobbies when he feels better include travel, gardening, reading, and a pet dog.  He never , has no children, lives alone, and broke up with a male partner about 18 months ago, with no current relationship.  He has few friends and is not W[employer].               Current Medications  Current Outpatient Medications on File Prior to Visit   Medication Sig Dispense Refill    aspirin 81 MG Chew Take 81 mg by mouth once daily.      clonazePAM (KLONOPIN) 1 MG tablet Take 1 mg by mouth 3 (three) times daily.  5    dextroamphetamine-amphetamine 30 mg Tab TAKE 1 TABLET BY MOUTH 3 TIMES A DAY Do Not Fill Before 11/29/2017  0    FREESTYLE FREEDOM LITE kit USE TO CHECK BLOOD SUGAR as directed daily  0    FREESTYLE LITE STRIPS Strp as directed Three times daily as needed  3    guselkumab (TREMFYA) 100 mg/mL Syrg Inject 100 mg into the skin.      insulin glargine (LANTUS SOLOSTAR U-100 INSULIN) 100 unit/mL (3 mL) InPn pen Inject 32 Units into the skin 2 (two) times daily. 4 Box 0    insulin NPH-insulin regular, 70/30, (NOVOLIN 70/30) 100 unit/mL (70-30) injection Inject into the skin.      JARDIANCE 10 mg Tab TAKE 1 TABLET BY MOUTH EVERY DAY 90 tablet 1    lithium (ESKALITH) 300 MG capsule Take 1,800 mg by mouth nightly.  11    metFORMIN (GLUCOPHAGE-XR) 500 MG 24 hr tablet Take 2 tablets (1,000 mg total) by mouth 2 (two) times daily with meals. 360 tablet 0    ULTICARE PEN NEEDLE 32 gauge x 5/32" Ndle USE AS DIRECTED TWICE A DAY FOR INSULIN INJECTION  1    VYVANSE 70 mg capsule Take 70 mg by mouth every morning.  0    [DISCONTINUED] " atorvastatin (LIPITOR) 20 MG tablet Take 20 mg by mouth once daily.  1    indomethacin (INDOCIN) 50 MG capsule TAKE 1 CAPSULE BY MOUTH DAILY WITH FOOD  0    oxyCODONE-acetaminophen (PERCOCET) 5-325 mg per tablet Take 1 tablet by mouth.      TRINTELLIX 20 mg Tab Take 1 tablet by mouth once daily.  11    [DISCONTINUED] lisinopril 10 MG tablet Take 1 tablet (10 mg total) by mouth once daily. 90 tablet 3    [DISCONTINUED] lisinopril 10 MG tablet Take 1 tablet (10 mg total) by mouth once daily. 30 tablet 0     No current facility-administered medications on file prior to visit.        Allergies   Review of patient's allergies indicates:  No Known Allergies    Review of Systems (Pertinent positives)  Review of Systems   Constitutional: Negative for chills, diaphoresis, fever, malaise/fatigue and weight loss.   HENT: Negative.    Eyes: Negative for blurred vision, double vision, photophobia, pain, discharge and redness.   Respiratory: Negative for cough, hemoptysis, sputum production, shortness of breath and wheezing.    Cardiovascular: Negative for chest pain, palpitations, orthopnea, claudication, leg swelling and PND.   Gastrointestinal: Positive for diarrhea. Negative for abdominal pain, heartburn, nausea and vomiting.   Genitourinary: Negative.    Musculoskeletal: Negative.    Skin: Negative.    Neurological: Positive for headaches. Negative for dizziness, tingling, tremors, sensory change, speech change, focal weakness, seizures, loss of consciousness and weakness.   Endo/Heme/Allergies: Negative.    Psychiatric/Behavioral: Positive for depression and memory loss.       /84 (BP Location: Right arm, Patient Position: Sitting, BP Method: Large (Manual))   Pulse 81   Temp 98.3 °F (36.8 °C) (Oral)   Resp 17   Ht 6' (1.829 m)   Wt 110.1 kg (242 lb 11.6 oz)   SpO2 96%   BMI 32.92 kg/m²     Physical Exam   Constitutional: He is oriented to person, place, and time. He appears well-developed and  well-nourished. No distress.   Eyes: Conjunctivae and EOM are normal. Pupils are equal, round, and reactive to light.   Neck: Normal range of motion. Neck supple.   Cardiovascular: Normal rate, regular rhythm and intact distal pulses. Exam reveals no gallop and no friction rub.   No murmur heard.  Pulmonary/Chest: Effort normal and breath sounds normal. No stridor. No respiratory distress. He has no wheezes. He has no rales. He exhibits no tenderness.   Abdominal: Soft. Bowel sounds are normal. He exhibits no distension and no mass. There is no tenderness. There is no rebound and no guarding. No hernia.   Musculoskeletal: Normal range of motion.   Neurological: He is alert and oriented to person, place, and time.   Skin: Skin is dry. Capillary refill takes less than 2 seconds. He is not diaphoretic.        Assessment/Plan:  Enrique Grande is a 43 y.o. male who presents today for :    Enrique was seen today for hypertension and medication review.    Diagnoses and all orders for this visit:    Essential hypertension  -     losartan (COZAAR) 25 MG tablet; Take 1 tablet (25 mg total) by mouth once daily.    Uncontrolled type 2 diabetes mellitus without complication, with long-term current use of insulin  -     atorvastatin (LIPITOR) 20 MG tablet; Take 1 tablet (20 mg total) by mouth once daily.    I extensively reviewed his medications with him, including when to take each.  I instructed him that he should organize his pills into morning, afternoon, and night weekly pill cases for better management. I also encouraged him that he should get a close friend or family member to help organize his medication as well.   BP controlled at this visit today. Will switch lipitor to losartan and have patient f/u with nurse BP check in 2-3 weeks. I emphasized the importance of taking this medication daily for BP control.   He should f/u otherwise as needed.        My collaborating physician is Dr. Cricket Huffman.

## 2019-01-03 NOTE — Clinical Note
Chloe,This patient was asking about the Freestyle glucose monitoring system and would like more information about it if he can get it covered. What do you think about this? Thanks, Logan Mckoy

## 2019-02-06 ENCOUNTER — OFFICE VISIT (OUTPATIENT)
Dept: FAMILY MEDICINE | Facility: CLINIC | Age: 44
End: 2019-02-06
Payer: COMMERCIAL

## 2019-02-06 VITALS
HEART RATE: 74 BPM | OXYGEN SATURATION: 98 % | HEIGHT: 72 IN | WEIGHT: 247.13 LBS | SYSTOLIC BLOOD PRESSURE: 152 MMHG | DIASTOLIC BLOOD PRESSURE: 102 MMHG | RESPIRATION RATE: 12 BRPM | BODY MASS INDEX: 33.47 KG/M2 | TEMPERATURE: 98 F

## 2019-02-06 DIAGNOSIS — Z00.00 VISIT FOR WELL MAN HEALTH CHECK: Primary | ICD-10-CM

## 2019-02-06 DIAGNOSIS — I10 BENIGN ESSENTIAL HTN: ICD-10-CM

## 2019-02-06 PROCEDURE — 99396 PREV VISIT EST AGE 40-64: CPT | Mod: S$GLB,,, | Performed by: FAMILY MEDICINE

## 2019-02-06 PROCEDURE — 3080F DIAST BP >= 90 MM HG: CPT | Mod: CPTII,S$GLB,, | Performed by: FAMILY MEDICINE

## 2019-02-06 PROCEDURE — 3077F PR MOST RECENT SYSTOLIC BLOOD PRESSURE >= 140 MM HG: ICD-10-PCS | Mod: CPTII,S$GLB,, | Performed by: FAMILY MEDICINE

## 2019-02-06 PROCEDURE — 99396 PR PREVENTIVE VISIT,EST,40-64: ICD-10-PCS | Mod: S$GLB,,, | Performed by: FAMILY MEDICINE

## 2019-02-06 PROCEDURE — 99999 PR PBB SHADOW E&M-EST. PATIENT-LVL III: ICD-10-PCS | Mod: PBBFAC,,, | Performed by: FAMILY MEDICINE

## 2019-02-06 PROCEDURE — 3077F SYST BP >= 140 MM HG: CPT | Mod: CPTII,S$GLB,, | Performed by: FAMILY MEDICINE

## 2019-02-06 PROCEDURE — 3080F PR MOST RECENT DIASTOLIC BLOOD PRESSURE >= 90 MM HG: ICD-10-PCS | Mod: CPTII,S$GLB,, | Performed by: FAMILY MEDICINE

## 2019-02-06 PROCEDURE — 99999 PR PBB SHADOW E&M-EST. PATIENT-LVL III: CPT | Mod: PBBFAC,,, | Performed by: FAMILY MEDICINE

## 2019-02-06 RX ORDER — METHOTREXATE 2.5 MG/1
TABLET ORAL
Refills: 3 | COMMUNITY
Start: 2019-01-29

## 2019-02-06 RX ORDER — LISDEXAMFETAMINE DIMESYLATE 70 MG/1
70 CAPSULE ORAL EVERY MORNING
Refills: 0 | Status: CANCELLED | OUTPATIENT
Start: 2019-02-06

## 2019-02-06 RX ORDER — LOSARTAN POTASSIUM AND HYDROCHLOROTHIAZIDE 12.5; 5 MG/1; MG/1
1 TABLET ORAL DAILY
Qty: 90 TABLET | Refills: 3 | Status: SHIPPED | OUTPATIENT
Start: 2019-02-06 | End: 2020-02-06

## 2019-02-06 RX ORDER — FOLIC ACID 1 MG/1
TABLET ORAL
Refills: 3 | COMMUNITY
Start: 2019-01-29

## 2019-02-06 RX ORDER — LISINOPRIL 40 MG/1
40 TABLET ORAL
COMMUNITY
End: 2019-02-06

## 2019-02-06 NOTE — LETTER
February 6, 2019      [facility]  [address]  [place] [address]  Phone: [phone]       Patient: Enrique Grande   YOB: 1975  Date of Visit: 02/06/2019    To Whom It May Concern:    Tremaine Grande  was at [facility] on 02/06/2019. He may return to work/school on 2/6/19 with no restrictions. If you have any questions or concerns, or if I can be of further assistance, please do not hesitate to contact me.    Sincerely,    Ginette Todd MA

## 2019-02-06 NOTE — PROGRESS NOTES
"Well Man VISIT      CHIEF COMPLAINT  Chief Complaint   Patient presents with    Annual Exam       HPI  Enrique Grande is a 43 y.o. male who presents for physical.     Social Factors  Tobacco use: No  Ready to Quit: No  Alcohol: No  Intimate partner violence screening  "Do you feel safe in your current relationship?" single  "Have you ever been in a relationship in which your partner frightened you or hurt you?" No  Living Will/POA: No  Regular Exercise: No    Depression  Over the past two weeks, have you felt down, depressed, or hopeless? No  Over the past two weeks, have you felt little interest or pleasure in doing things? No    Reproductive Health  STD screening in last year: deferred  HIV screening: deferred    Screen for Chronic Disease  CHD Risk Factors: diabetes mellitus, dyslipidemia, hypertension, male gender and obesity (BMI >= 30 kg/m2)  Estimated body mass index is 33.52 kg/m² as calculated from the following:    Height as of this encounter: 6' (1.829 m).    Weight as of this encounter: 112.1 kg (247 lb 2.2 oz).  Dyslipidemia screening needed: order 2/6/19  T2DM screening needed: order 2/6/19  Colonoscopy needed: n/a  PSA needed: n/a  AAA screening needed:n/a  Screen men 35 years and older, and men 20 to 34 years of age who have cardiovascular risk factors for dyslipidemia  Begin screening colonoscopies at 50 years of age in men of average risk, and continue until 75 years of age; offer fecal occult blood testing every year, flexible sigmoidoscopy every five years combined with fecal occult blood testing every three years, or colonoscopy every 10 years   The American Urological Association recommends offering PSA testing and digital rectal examination to well-informed men beginning at 40 years of age and continuing until life expectancy is less than 10 years  Screen once with ultrasonography in men 65 to 75 years of age if they have a family history or have smoked at [id number] cigarettes in " their lifetime  Screen men with a sustained blood pressure greater than 135/80 mm Hg for T2DM      Immunizations  stated as up to date, no records available    ALLERGIES and MEDS were verified.   PMHx, PSHx, FHx, SOCIALHx were updated as pertinent.    REVIEW OF SYSTEMS  Review of Systems   Constitutional: Negative.    HENT: Negative.    Eyes: Negative.    Respiratory: Negative.    Cardiovascular: Negative.    Gastrointestinal: Negative.    Genitourinary: Negative.    Musculoskeletal: Negative.    Skin: Negative.    Neurological: Negative.    Psychiatric/Behavioral: Positive for depression. Negative for hallucinations, memory loss, substance abuse and suicidal ideas. The patient is nervous/anxious. The patient does not have insomnia.          PHYSICAL EXAM  VITAL SIGNS: BP (!) 152/102 (BP Location: Right arm, Patient Position: Sitting, BP Method: Medium (Manual))   Pulse 74   Temp 98.1 °F (36.7 °C) (Oral)   Resp 12   Ht 6' (1.829 m)   Wt 112.1 kg (247 lb 2.2 oz)   SpO2 98%   BMI 33.52 kg/m²   GEN: Well developed, Well nourished, No acute distress.  HENT: Normocephalic, Atraumatic, Bilateral external ears normal, Nose normal, Oropharynx moist, No oral exudates.   Eyes: PERRL, EOMI, Conjunctiva normal, No discharge.   Neck: Supple, No tenderness.  Lymphatic: No cervical or supraclavicular lymphadenopathy noted.   Cardiovascular: Normal heart rate, Normal rhythm, No murmurs, No rubs, No gallops.   Thorax & Lungs: Normal breath sounds, No respiratory distress, No wheezing.  Abdomen: Soft, No tenderness, Bowel sounds normal.  Genital: deferred  Skin: Warm, Dry, No erythema, No rash.   Extremities: No edema, No tenderness.       ASSESSMENT/PLAN    Enrique was seen today for annual exam.    Diagnoses and all orders for this visit:    Visit for [facility] health check  -     Hemoglobin A1c; Future  -     CBC auto differential; Future  -     Comprehensive metabolic panel; Future  -     URINALYSIS; Future  -     Lipid  panel; Future    Benign essential HTN  -     losartan-hydrochlorothiazide 50-12.5 mg (HYZAAR) 50-12.5 mg per tablet; Take 1 tablet by mouth once daily.    Other orders  -     Cancel: VYVANSE 70 mg capsule; Take 1 capsule (70 mg total) by mouth every morning.         FOLLOW UP: 3 months      Sunny Humphreys MD

## 2019-02-08 ENCOUNTER — TELEPHONE (OUTPATIENT)
Dept: FAMILY MEDICINE | Facility: CLINIC | Age: 44
End: 2019-02-08

## 2019-02-08 NOTE — TELEPHONE ENCOUNTER
----- Message from Nikolas Grimes sent at 2/8/2019 11:28 AM CST -----  Contact: Self/[phone]  The patient would like to speak to the staff regarding paperwork. He's stating he cant turn the paperwork in it need to be faxed.          Thank you

## 2019-03-01 RX ORDER — INSULIN GLARGINE 100 [IU]/ML
INJECTION, SOLUTION SUBCUTANEOUS
Qty: 45 ML | Refills: 2 | Status: SHIPPED | OUTPATIENT
Start: 2019-03-01

## 2019-03-01 RX ORDER — EMPAGLIFLOZIN 10 MG/1
TABLET, FILM COATED ORAL
Qty: 90 TABLET | Refills: 1 | Status: SHIPPED | OUTPATIENT
Start: 2019-03-01

## 2019-03-18 RX ORDER — PEN NEEDLE, DIABETIC 31 GX5/16"
NEEDLE, DISPOSABLE MISCELLANEOUS
Qty: 200 EACH | Refills: 1 | Status: SHIPPED | OUTPATIENT
Start: 2019-03-18

## 2019-04-01 RX ORDER — ATORVASTATIN CALCIUM 20 MG/1
20 TABLET, FILM COATED ORAL DAILY
Qty: 30 TABLET | Refills: 3 | OUTPATIENT
Start: 2019-04-01

## 2019-04-16 RX ORDER — METFORMIN HYDROCHLORIDE 500 MG/1
TABLET, EXTENDED RELEASE ORAL
Qty: 360 TABLET | Refills: 0 | Status: SHIPPED | OUTPATIENT
Start: 2019-04-16

## 2019-04-29 RX ORDER — ATORVASTATIN CALCIUM 20 MG/1
20 TABLET, FILM COATED ORAL DAILY
Qty: 30 TABLET | Refills: 3
Start: 2019-04-29

## 2019-05-23 RX ORDER — ATORVASTATIN CALCIUM 20 MG/1
20 TABLET, FILM COATED ORAL DAILY
Qty: 30 TABLET | Refills: 3 | OUTPATIENT
Start: 2019-05-23

## 2019-06-03 RX ORDER — ATORVASTATIN CALCIUM 20 MG/1
20 TABLET, FILM COATED ORAL DAILY
Qty: 30 TABLET | Refills: 0 | Status: SHIPPED | OUTPATIENT
Start: 2019-06-03

## 2019-07-19 RX ORDER — METFORMIN HYDROCHLORIDE 500 MG/1
TABLET, EXTENDED RELEASE ORAL
Qty: 360 TABLET | Refills: 3 | OUTPATIENT
Start: 2019-07-19

## 2019-08-20 RX ORDER — EMPAGLIFLOZIN 10 MG/1
TABLET, FILM COATED ORAL
Qty: 90 TABLET | Refills: 1 | OUTPATIENT
Start: 2019-08-20

## 2019-11-04 RX ORDER — INSULIN GLARGINE 100 [IU]/ML
INJECTION, SOLUTION SUBCUTANEOUS
Qty: 15 ML | OUTPATIENT
Start: 2019-11-04

## 2019-11-18 DIAGNOSIS — I10 BENIGN ESSENTIAL HTN: ICD-10-CM

## 2019-11-18 RX ORDER — LOSARTAN POTASSIUM AND HYDROCHLOROTHIAZIDE 12.5; 5 MG/1; MG/1
1 TABLET ORAL DAILY
Qty: 90 TABLET | Refills: 3 | OUTPATIENT
Start: 2019-11-18 | End: 2020-11-17

## 2020-01-28 DIAGNOSIS — I10 BENIGN ESSENTIAL HTN: ICD-10-CM

## 2020-01-28 RX ORDER — LOSARTAN POTASSIUM AND HYDROCHLOROTHIAZIDE 12.5; 5 MG/1; MG/1
1 TABLET ORAL DAILY
Qty: 90 TABLET | Refills: 3 | OUTPATIENT
Start: 2020-01-28 | End: 2021-01-27

## 2020-02-13 ENCOUNTER — TELEPHONE (OUTPATIENT)
Dept: FAMILY MEDICINE | Facility: CLINIC | Age: 45
End: 2020-02-13

## 2020-02-13 NOTE — TELEPHONE ENCOUNTER
Attempted to contact pt. Regarding f/u appt. For HTN. No answer, left message on VM to call office and schedule appt/

## 2020-05-12 ENCOUNTER — PATIENT MESSAGE (OUTPATIENT)
Dept: ADMINISTRATIVE | Facility: HOSPITAL | Age: 45
End: 2020-05-12

## 2020-07-07 LAB — HBA1C MFR BLD: 7.1 % (ref 4–6)

## 2020-09-08 ENCOUNTER — PATIENT OUTREACH (OUTPATIENT)
Dept: ADMINISTRATIVE | Facility: HOSPITAL | Age: 45
End: 2020-09-08

## 2020-10-05 ENCOUNTER — PATIENT MESSAGE (OUTPATIENT)
Dept: ADMINISTRATIVE | Facility: HOSPITAL | Age: 45
End: 2020-10-05

## 2020-12-21 ENCOUNTER — TELEPHONE (OUTPATIENT)
Dept: FAMILY MEDICINE | Facility: CLINIC | Age: 45
End: 2020-12-21

## 2021-01-04 ENCOUNTER — PATIENT MESSAGE (OUTPATIENT)
Dept: ADMINISTRATIVE | Facility: HOSPITAL | Age: 46
End: 2021-01-04

## 2021-04-06 ENCOUNTER — PATIENT MESSAGE (OUTPATIENT)
Dept: ADMINISTRATIVE | Facility: HOSPITAL | Age: 46
End: 2021-04-06

## 2021-07-07 ENCOUNTER — PATIENT MESSAGE (OUTPATIENT)
Dept: ADMINISTRATIVE | Facility: HOSPITAL | Age: 46
End: 2021-07-07

## 2021-08-04 ENCOUNTER — PATIENT MESSAGE (OUTPATIENT)
Dept: ADMINISTRATIVE | Facility: HOSPITAL | Age: 46
End: 2021-08-04

## 2021-10-04 ENCOUNTER — PATIENT MESSAGE (OUTPATIENT)
Dept: ADMINISTRATIVE | Facility: HOSPITAL | Age: 46
End: 2021-10-04